# Patient Record
Sex: FEMALE | Race: BLACK OR AFRICAN AMERICAN | Employment: OTHER | URBAN - METROPOLITAN AREA
[De-identification: names, ages, dates, MRNs, and addresses within clinical notes are randomized per-mention and may not be internally consistent; named-entity substitution may affect disease eponyms.]

---

## 2017-09-06 ENCOUNTER — APPOINTMENT (OUTPATIENT)
Dept: PHYSICAL THERAPY | Age: 27
End: 2017-09-06

## 2017-09-12 ENCOUNTER — HOSPITAL ENCOUNTER (OUTPATIENT)
Dept: PHYSICAL THERAPY | Age: 27
Discharge: HOME | End: 2017-09-12
Payer: MEDICAID

## 2017-09-12 PROCEDURE — 97162 PT EVAL MOD COMPLEX 30 MIN: CPT

## 2017-09-12 PROCEDURE — 97110 THERAPEUTIC EXERCISES: CPT

## 2017-09-12 NOTE — PROGRESS NOTES
PT DAILY TREATMENT NOTE    Patient Name: Judith Ryder  Date:2017  : 1990  [x]  Patient  Verified  Payor: Chucho Wang / Plan: Wicho Bottoms / Product Type: Managed Care Medicaid /    In time:1:00  Out time:1:50  Total Treatment Time (min): 50  Total Timed Codes (min): 40  1:1 Treatment Time ( [address] only):    Visit #: 1 of 8    Physical Therapy Evaluation - Lumbar Spine      See Plan of Care for Subjective Info/History    General Health:  Red Flags Indicated?  [] Yes    [x] No  List:  Past History/Treatments:     Diagnostic Tests: [] Lab work [] X-rays    [] CT [] MRI     [x] Other: None  Results:    Functional Status  Prior level of function: Independent w/ ADL's, mother of 3 y/o child  Present functional limitations:    OBJECTIVE  Posture: Mild increased lumbar lordosis, genu valgus, decreased medial arch heights    Gait:  [x] Normal     [] Abnormal:    Active Movements: [] N/A   [] Too acute   [] Other:  ROM % AROM % PROM Comments:pain, area   Forward flexion  90  Central LBP   Extension  WFL     SB right  WFL     SB left  WFL     Rotation right  80%  Lumbar stiffness   Rotation left  80%  Lumbar stiffness     Repeated Movement Testing:    Neuro Screen [] WNL  Myotome/Dermatome/Reflexes:  Comments:    Dural Mobility:  SLR Sitting: [] R    [] L    [] +    [] -  @ (degrees):           Supine: [] R    [] L    [] +    [x] -  @ (degrees):   Slump Test: [] R    [] L    [] +    [x] -  @ (degrees):   Prone Knee Bend: [] R    [] L    [] +    [] -     Palpation  [] Min  [x] Mod  [] Severe    Location: Spinous processes and lumbar paraspinals L2-5  [] Min  [] Mod  [] Severe    Location:  [] Min  [] Mod  [] Severe    Location:      Strength   L(0-5) R (0-5) N/T   Hip Flexion (L1,2) 5 5 []   Knee Extension (L3,4) 5 5 []   Ankle Dorsiflexion (L4) 5 5 []   Great Toe Extension (L5)   []   Ankle Plantarflexion (S1) 4+ 4+ []   Knee Flexion (S1,2)   []   Upper Abdominals   []   Lower Abdominals   [] Paraspinals   []   Back Rotators   []   Gluteus Lon   []   Other   []     Special Tests  Lumbar:  Lumb. Compression: [] Pos  [] Neg               Lumbar Distraction:   [] Pos  [] Neg    Quadrant:  [] Pos  [x] Neg   [] Flex  [] Ext    Sacroilliac:  Gaenslen's: [] R    [] L    [] +    [] -     Compression: [] +    [x] -     Gapping:  [] +    [x] -     Thigh Thrust: [] R    [] L    [] +    [] -     Leg Length: [] +    [x] -   Position:           Hip: Byron Pat:  [] R    [] L    [] +    [] -     Scour:  [] R    [] L    [] +    [] -     Piriformis: [] R    [] L    [] +    [x] -     Other tests/comments:  OBJECTIVE  Modality (rationale): Increase tissue extensibility to improve the ability to change and maintain body/trunk positions    []  E-Stim: type _ x _ min     []att   []unatt   []w/US   []w/ice   []w/heat  []  Traction: []cerv   []pelvic   _ lbs x _ min     []pro   []sup   []int   []const  []  Ultrasound: []cont   []pulse    _ W/cm2 x _  min   []1MHz   []3MHz  []  Iontophoresis: []take home patch w/ dexamethazone    []40mA   []80mA                               []_ mA min w/: []dexamethazone   []other:_  []  Ice pack _  min     [x] Hot pack 10_  min  - lumbar  [] Paraffin _  min  []  Other:     Therapeutic Exercise: [x] see flow sheet     [] Other:_  Added/Changed Exercises:  [x]  Added:_  to improve (function): improve the patient's ability to change and maintain body/trunk positions  []  Changed:_ to improve (function):  (minutes: 10 )    Other Objective/Functional Measures:   Pain Level (0-10 scale) post treatment: 5    ASSESSMENT  [x]  See Plan of Care  Patient is assigned a medium evaluation complexity based upon presence of 2 medical comorbidities, FOTO score, and changing symptom characteristics.     PLAN  See Sal aMncuso 9/12/2017  1:02 PM
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
## 2017-09-15 ENCOUNTER — HOSPITAL ENCOUNTER (OUTPATIENT)
Dept: PHYSICAL THERAPY | Age: 27
Discharge: HOME | End: 2017-09-15
Payer: MEDICAID

## 2017-09-15 PROCEDURE — 97110 THERAPEUTIC EXERCISES: CPT

## 2017-09-15 NOTE — PROGRESS NOTES
PT DAILY TREATMENT NOTE 8-14    Patient Name: Ellen Guthrie  Date:9/15/2017  : 1990  [x]  Patient  Verified  Payor: Randal Donovan / Plan: Fanny Rodriguez / Product Type: Managed Care Medicaid /    In time:9:28  Out time:10:12  Total Treatment Time (min): 44  Total Timed Codes (min): 29  1:1 Treatment Time (min):    Visit #: 2 of 8    Treatment Area: Back pain [M54.9]    SUBJECTIVE  Pain Level (0-10 scale): 0  Any medication changes, allergies to medications, adverse drug reactions, diagnosis change, or new procedure performed?: [x] No    [] Yes (see summary sheet for update)  Subjective functional status/changes:   [] No changes reported  My back actually doesn't feel too bad today,but it bothers me if I do too much or if I sit or lay down for too long.     OBJECTIVE  Modality rationale: decrease pain and increase tissue extensibility to improve the patients ability to improve functional abilities   Min Type Additional Details    [] Estim: []Att   []Unatt        []TENS instruct                  []IFC  []Premod   []NMES                     []Other:  []w/US   []w/ice   []w/heat  Position:  Location:    []  Traction: [] Cervical       []Lumbar                       [] Prone          []Supine                       []Intermittent   []Continuous Lbs:  [] before manual  [] after manual    []  Ultrasound: []Continuous   [] Pulsed                           []1MHz   []3MHz Location:  W/cm2:    []  Iontophoresis with dexamethasone         Location: [] Take home patch   [] In clinic   10 []  Ice     [x]  heat  []  Ice massage Position:supine  Location:lumbar    []  Vasopneumatic Device Pressure:       [] lo [] med [] hi   Temperature: [] lo [] med [] hi   [] Skin assessment post-treatment:  []intact []redness- no adverse reaction       []redness - adverse reaction:       34 min Therapeutic Exercise:  [x] See flow sheet :   Rationale: increase ROM and increase strength to improve the patients ability to improve functional abilities           min Patient Education: [x] Review HEP    [] Progressed/Changed HEP based on:   [] positioning   [] body mechanics   [] transfers   [] heat/ice application        Other Objective/Functional Measures:     Pain Level (0-10 scale) post treatment: 0    ASSESSMENT/Changes in Function: Pt tolerated all new therex well upon trial today with chief c/o bilateral lumbar quadrant pain/symptoms with prolonged static sitting and laying. pt needs the most focus on lumbar/LE flexibility and lumbopelvic/core stabilization. Pt was challenged the most with maintaining lumbopelvic/core stability with seated stability ball marches and bridges. Will continue to progress/advance patient within current POC as tolerated with monitoring symptoms. Patient will continue to benefit from skilled PT services to modify and progress therapeutic interventions, address functional mobility deficits, address ROM deficits, address strength deficits, analyze and cue movement patterns, analyze and modify body mechanics/ergonomics and assess and modify postural abnormalities to attain remaining goals.      []  See Plan of Care  []  See progress note/recertification  []  See Discharge Summary         Progress towards goals / Updated goals:      PLAN  [x]  Upgrade activities as tolerated     []  Continue plan of care  []  Update interventions per flow sheet       []  Discharge due to:_  []  Other:_      Ned Toth, PTA 9/15/2017  9:25 AM

## 2017-09-18 ENCOUNTER — HOSPITAL ENCOUNTER (OUTPATIENT)
Dept: PHYSICAL THERAPY | Age: 27
Discharge: HOME | End: 2017-09-18
Payer: MEDICAID

## 2017-09-18 PROCEDURE — 97110 THERAPEUTIC EXERCISES: CPT

## 2017-09-18 NOTE — PROGRESS NOTES
PT DAILY TREATMENT NOTE 8-14    Patient Name: Mayco Galeano  Date:2017  : 1990  [x]  Patient  Verified  Payor: Belinda Esposito / Plan: Frankey Dam / Product Type: Managed Care Medicaid /    In time:3:01  Out time:3:50  Total Treatment Time (min): 49  Total Timed Codes (min): 34  1:1 Treatment Time (min):    Visit #: 3 of 8    Treatment Area: Back pain [M54.9]    SUBJECTIVE  Pain Level (0-10 scale): 0  Any medication changes, allergies to medications, adverse drug reactions, diagnosis change, or new procedure performed?: [x] No    [] Yes (see summary sheet for update)  Subjective functional status/changes:   [] No changes reported  I had a little bit of pain for about an hour after I got home from the therapy last time, but I haven't really had any since, so I am fine.     OBJECTIVE  Modality rationale: decrease pain and increase tissue extensibility to improve the patients ability to improve functional abilities   Min Type Additional Details    [] Estim: []Att   []Unatt        []TENS instruct                  []IFC  []Premod   []NMES                     []Other:  []w/US   []w/ice   []w/heat  Position:  Location:    []  Traction: [] Cervical       []Lumbar                       [] Prone          []Supine                       []Intermittent   []Continuous Lbs:  [] before manual  [] after manual    []  Ultrasound: []Continuous   [] Pulsed                           []1MHz   []3MHz Location:  W/cm2:    []  Iontophoresis with dexamethasone         Location: [] Take home patch   [] In clinic   10 []  Ice     [x]  heat  []  Ice massage Position:supine  Location:lumbar    []  Vasopneumatic Device Pressure:       [] lo [] med [] hi   Temperature: [] lo [] med [] hi   [] Skin assessment post-treatment:  []intact []redness- no adverse reaction       []redness - adverse reaction:       39 min Therapeutic Exercise:  [x] See flow sheet :   Rationale: increase ROM and increase strength to improve the patients ability to improve functional abilities           min Patient Education: [x] Review HEP    [] Progressed/Changed HEP based on:   [] positioning   [] body mechanics   [] transfers   [] heat/ice application        Other Objective/Functional Measures:     Pain Level (0-10 scale) post treatment: 0    ASSESSMENT/Changes in Function: Pt presents with only c/o mild post exercise soreness for approximately 1 hour after last tx, but denies any increase in symptoms since. Pt was also able to advance to addition of level 1 dead bug stabs with min to mod challenge today. Will continue to progress/advance patient within current POC as tolerated with monitoring symptoms. Patient will continue to benefit from skilled PT services to modify and progress therapeutic interventions, address functional mobility deficits, address ROM deficits, address strength deficits, analyze and cue movement patterns, analyze and modify body mechanics/ergonomics and assess and modify postural abnormalities to attain remaining goals.      []  See Plan of Care  []  See progress note/recertification  []  See Discharge Summary         Progress towards goals / Updated goals:      PLAN  [x]  Upgrade activities as tolerated     []  Continue plan of care  []  Update interventions per flow sheet       []  Discharge due to:_  []  Other:_      Connie Lai, RADHA 9/18/2017  3:11 PM

## 2017-09-20 ENCOUNTER — HOSPITAL ENCOUNTER (OUTPATIENT)
Dept: PHYSICAL THERAPY | Age: 27
Discharge: HOME | End: 2017-09-20
Payer: MEDICAID

## 2017-09-20 PROCEDURE — 97110 THERAPEUTIC EXERCISES: CPT

## 2017-09-20 NOTE — PROGRESS NOTES
PT DAILY TREATMENT NOTE 8-14    Patient Name: Judith Ryder  Date:2017  : 1990  [x]  Patient  Verified  Payor: Chucho Wang / Plan: [id number] / Product Type: Managed Care Medicaid /    In time:3:00  Out time:3:49  Total Treatment Time (min): 49  Total Timed Codes (min): 34  1:1 Treatment Time (min):    Visit #: 4 of 8    Treatment Area: Back pain [M54.9]    SUBJECTIVE  Pain Level (0-10 scale): 0  Any medication changes, allergies to medications, adverse drug reactions, diagnosis change, or new procedure performed?: [x] No    [] Yes (see summary sheet for update)  Subjective functional status/changes:   [] No changes reported  My back has been feeling pretty good lately, I actually haven't had any pain or even any soreness at all in my back since I was here last.    OBJECTIVE  Modality rationale: decrease pain and increase tissue extensibility to improve the patients ability to improve functional abilities   Min Type Additional Details    [] Estim: []Att   []Unatt        []TENS instruct                  []IFC  []Premod   []NMES                     []Other:  []w/US   []w/ice   []w/heat  Position:  Location:    []  Traction: [] Cervical       []Lumbar                       [] Prone          []Supine                       []Intermittent   []Continuous Lbs:  [] before manual  [] after manual    []  Ultrasound: []Continuous   [] Pulsed                           []1MHz   []3MHz Location:  W/cm2:    []  Iontophoresis with dexamethasone         Location: [] Take home patch   [] In clinic   10 []  Ice     [x]  heat  []  Ice massage Position:supine  Location:lumbar    []  Vasopneumatic Device Pressure:       [] lo [] med [] hi   Temperature: [] lo [] med [] hi   [] Skin assessment post-treatment:  []intact []redness- no adverse reaction       []redness - adverse reaction:       39 min Therapeutic Exercise:  [x] See flow sheet :   Rationale: increase ROM and increase strength to improve the patients ability to improve functional abilities         min Patient Education: [x] Review HEP    [] Progressed/Changed HEP based on:   [] positioning   [] body mechanics   [] transfers   [] heat/ice application        Other Objective/Functional Measures:     Pain Level (0-10 scale) post treatment: 1-2/10    ASSESSMENT/Changes in Function: Patient reports approximately 50% overall improvement from therapy since initial evaluation. Pt was also able to advance to addition of pelvic tilts with overhead medicine ball raises with min to mod challenge with lordotic control today. Will continue to progress/advance patient within current POC as tolerated with monitoring symptoms. Patient will continue to benefit from skilled PT services to modify and progress therapeutic interventions, address functional mobility deficits, address ROM deficits, address strength deficits, analyze and address soft tissue restrictions, analyze and cue movement patterns, analyze and modify body mechanics/ergonomics and assess and modify postural abnormalities to attain remaining goals.      []  See Plan of Care  []  See progress note/recertification  []  See Discharge Summary         Progress towards goals / Updated goals:  STG #2=Patient will independently demonstrate proper lifting mechanics to promote lumbar safety and reduced injury risk:Met, as of today with ability to perform 5 crate lifts floor<>bed with good technique    PLAN  [x]  Upgrade activities as tolerated     []  Continue plan of care  []  Update interventions per flow sheet       []  Discharge due to:_  []  Other:_      Chidi Daigle, RADHA 9/20/2017  3:03 PM

## 2017-09-25 ENCOUNTER — HOSPITAL ENCOUNTER (OUTPATIENT)
Dept: PHYSICAL THERAPY | Age: 27
Discharge: HOME | End: 2017-09-25
Payer: MEDICAID

## 2017-09-25 PROCEDURE — 97110 THERAPEUTIC EXERCISES: CPT

## 2017-09-25 NOTE — PROGRESS NOTES
PT DAILY TREATMENT NOTE 8-14    Patient Name: Kesha Guthrie  Date:2017  : 1990  [x]  Patient  Verified  Payor: Jose Blackman / Plan: [employer] 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     OBJECTIVE  Modality rationale: decrease pain and increase tissue extensibility to improve the patients ability to improve functional abilities   Min Type Additional Details    [] Estim: []Att   []Unatt        []TENS instruct                  []IFC  []Premod   []NMES                     []Other:  []w/US   []w/ice   []w/heat  Position:  Location:    []  Traction: [] Cervical       []Lumbar                       [] Prone          []Supine                       []Intermittent   []Continuous Lbs:  [] before manual  [] after manual    []  Ultrasound: []Continuous   [] Pulsed                           []1MHz   []3MHz Location:  W/cm2:    []  Iontophoresis with dexamethasone         Location: [] Take home patch   [] In clinic   10 []  Ice     [x]  heat  []  Ice massage Position:  Location:lumbar    []  Vasopneumatic Device Pressure:       [] lo [] med [] hi   Temperature: [] lo [] med [] hi   [] Skin assessment post-treatment:  []intact []redness- no adverse reaction       []redness - adverse reaction:       37 min Therapeutic Exercise:  [x] See flow sheet :   Rationale: increase ROM and increase strength to improve the patients ability to improve functional abilities           min Patient Education: [x] Review HEP    [] Progressed/Changed HEP based on:   [] positioning   [] body mechanics   [] transfers   [] heat/ice application        Other Objective/Functional Measures:     Pain Level (0-10 scale) post treatment: 3/10    ASSESSMENT/Changes in Function: Pt presented with only increase in R lumbar quadrant pain/sxs with prolonged standing activity for appx 1 hour since last treatment, but denies and LE radicular pain. Will continue to progress/advance patient within current POC as tolerated with monitoring symptoms. Patient will continue to benefit from skilled PT services to modify and progress therapeutic interventions, address functional mobility deficits, address ROM deficits, address strength deficits, analyze and cue movement patterns, analyze and modify body mechanics/ergonomics and assess and modify postural abnormalities to attain remaining goals.      []  See Plan of Care  []  See progress note/recertification  []  See Discharge Summary         Progress towards goals / Updated goals:      PLAN  [x]  Upgrade activities as tolerated     []  Continue plan of care  []  Update interventions per flow sheet       []  Discharge due to:_  []  Other:_      Latanya Ochoa, RADHA 9/25/2017  3:06 PM

## 2017-09-27 ENCOUNTER — HOSPITAL ENCOUNTER (OUTPATIENT)
Dept: PHYSICAL THERAPY | Age: 27
Discharge: HOME | End: 2017-09-27
Payer: MEDICAID

## 2017-09-27 PROCEDURE — 97110 THERAPEUTIC EXERCISES: CPT

## 2017-09-27 NOTE — PROGRESS NOTES
PT DAILY TREATMENT NOTE 8-    Patient Name: Loraine Parada  Date:2017  : 1990  [x]  Patient  Verified  Payor: Jazmin Sorto / Plan: [id number] & Clear / Product Type: Managed Care Medicaid /    In time:3:06  Out time:4:01  Total Treatment Time (min): 55  Total Timed Codes (min): 39  1:1 Treatment Time (min):    Visit #: 6 of 8    Treatment Area: Back pain [M54.9]    SUBJECTIVE  Pain Level (0-10 scale): 0  Any medication changes, allergies to medications, adverse drug reactions, diagnosis change, or new procedure performed?: [x] No    [] Yes (see summary sheet for update)  Subjective functional status/changes:   [] No changes reported  My back has been feeling pretty good since I was here last, I haven't really had any flare ups that I can think of.     OBJECTIVE  Modality rationale: decrease pain and increase tissue extensibility to improve the patients ability to improve functional abilities   Min Type Additional Details    [] Estim: []Att   []Unatt        []TENS instruct                  []IFC  []Premod   []NMES                     []Other:  []w/US   []w/ice   []w/heat  Position:  Location:    []  Traction: [] Cervical       []Lumbar                       [] Prone          []Supine                       []Intermittent   []Continuous Lbs:  [] before manual  [] after manual    []  Ultrasound: []Continuous   [] Pulsed                           []1MHz   []3MHz Location:  W/cm2:    []  Iontophoresis with dexamethasone         Location: [] Take home patch   [] In clinic   10 []  Ice     [x]  heat  []  Ice massage Position:supine  Location:lumbar    []  Vasopneumatic Device Pressure:       [] lo [] med [] hi   Temperature: [] lo [] med [] hi   [] Skin assessment post-treatment:  []intact []redness- no adverse reaction       []redness - adverse reaction:       45 min Therapeutic Exercise:  [] See flow sheet :   Rationale: increase ROM and increase strength to improve the patients ability to improve functional abilities           min Patient Education: [x] Review HEP    [] Progressed/Changed HEP based on:   [] positioning   [] body mechanics   [] transfers   [] heat/ice application        Other Objective/Functional Measures:     Pain Level (0-10 scale) post treatment: 0    ASSESSMENT/Changes in Function: Pt presents with the only reoccurrence of pain/sxs with centralized lumbosacral pain/soreness for approximetely 1 hour after treatment sessions before abolishing. Pt was also able to advance to addition of level 2 dead bug stabs and seated stability ball UE/LE combo marches with min to mod challenge today. Will continue to progress/advance patient within current POC as tolerated with monitoring symptoms. Patient will continue to benefit from skilled PT services to modify and progress therapeutic interventions, address functional mobility deficits, address ROM deficits, address strength deficits, analyze and cue movement patterns, analyze and modify body mechanics/ergonomics and assess and modify postural abnormalities to attain remaining goals.      []  See Plan of Care  []  See progress note/recertification  []  See Discharge Summary         Progress towards goals / Updated goals:      PLAN  [x]  Upgrade activities as tolerated     []  Continue plan of care  []  Update interventions per flow sheet       []  Discharge due to:_  []  Other:_      Freda Villavicencio PTA 9/27/2017  3:11 PM

## 2017-10-02 ENCOUNTER — HOSPITAL ENCOUNTER (OUTPATIENT)
Dept: PHYSICAL THERAPY | Age: 27
Discharge: HOME | End: 2017-10-02
Payer: MEDICAID

## 2017-10-02 PROCEDURE — 97110 THERAPEUTIC EXERCISES: CPT

## 2017-10-02 NOTE — PROGRESS NOTES
PT DAILY TREATMENT NOTE 8-14    Patient Name: Kevin Miles  Date:10/2/2017  : 1990  [x]  Patient  Verified  Payor: Chel Evans / Plan: Frantz Angle / Product Type: Managed Care Medicaid /    In time:2:05  Out time:2:42  Total Treatment Time (min): 37  Total Timed Codes (min): 31  1:1 Treatment Time (min):    Visit #: 7 of 8    Treatment Area: Back pain [M54.9]    SUBJECTIVE  Pain Level (0-10 scale): 0  Any medication changes, allergies to medications, adverse drug reactions, diagnosis change, or new procedure performed?: [x] No    [] Yes (see summary sheet for update)  Subjective functional status/changes:   [] No changes reported  My back has been feeling pretty good since I was here last, no new issues at all, so I think that I will be fine with the next time being my last time. OBJECTIVE      37 min Therapeutic Exercise:  [x] See flow sheet :   Rationale: increase ROM and increase strength to improve the patients ability to improve functional abilities          min Patient Education: [x] Review HEP    [] Progressed/Changed HEP based on:   [] positioning   [] body mechanics   [] transfers   [] heat/ice application        Other Objective/Functional Measures:     Pain Level (0-10 scale) post treatment: 0    ASSESSMENT/Changes in Function: Pt presents with no reoccurrence of any lumbar pain/sxs since last tx and agrees that she is near maximum medical benefit/potential from current POC and will be ready for discharge next treatment as planned/discussed. Will continue to progress/advance patient within current POC as tolerated with monitoring symptoms.     Patient will continue to benefit from skilled PT services to modify and progress therapeutic interventions, address functional mobility deficits, address ROM deficits, address strength deficits, analyze and cue movement patterns, analyze and modify body mechanics/ergonomics and assess and modify postural abnormalities to attain remaining goals. []  See Plan of Care  []  See progress note/recertification  []  See Discharge Summary         Progress towards goals / Updated goals:  LTG #3=Patient will report >50% functional improvement with daily bending and lifting activities:Met, 100% improvement reported  Will review detailed progress/LTGs for planned discharge next visit.   PLAN  [x]  Upgrade activities as tolerated     []  Continue plan of care  []  Update interventions per flow sheet       []  Discharge due to:_  []  Other:_      Eric Ko PTA 10/2/2017  2:05 PM

## 2017-10-06 ENCOUNTER — HOSPITAL ENCOUNTER (OUTPATIENT)
Dept: PHYSICAL THERAPY | Age: 27
Discharge: HOME | End: 2017-10-06
Payer: MEDICAID

## 2017-10-06 PROCEDURE — 97110 THERAPEUTIC EXERCISES: CPT

## 2017-10-06 NOTE — PROGRESS NOTES
[facility] AT [address], [address]  Phone: [phone]   [phone]  DISCHARGE SUMMARY  Patient Name: Nataliia Adams : 1990   Treatment/Medical Diagnosis: Back pain [M54.9]   Referral Source: Celeste Hooks MD     Date of Initial Visit: 17 Attended Visits: 8 Missed Visits: 0     SUMMARY OF TREATMENT  Therapeutic exercise for lumbar/LE flexibility, postural awareness/strengthening, lumbopelvic/core stabilization, moist heat and HEP. CURRENT STATUS  Patient reports approximately  85% overall improvement from therapy since initial evaluation with no reported reoccurrence of increased pain/symptoms over the past 2 weeks. Pt presents with full expected lumbar AROM without any reproducible pain even at end range with only limitation with end range extension due to dysfunction. Pt has made excellent progress with gaining lumbar flexibility and advancing within level 2 lumbopelvic/core stabilization program within current POC. Pt also reports that she has returned to all premorbid ADL's without increasing pain/sxs over the past few visits. Pt was given and instructed in an updated HEP for continued self maintenance of reduced sxs after D/C from therapy. Lumbar AROM:  Flexion=90%; Extension= 65%; Side bending= 85% bilaterally   Goal/Measure of Progress Goal Met? 1. Patient will independently demonstrate proper lifting mechanics to promote lumbar safety and reduced injury risk   Status at last Eval: Progressing Current Status: Met yes   2. Improve FOTO outcome score by 15-20% to indicate a significant improvement in ADL function   Status at last Eval: 57/100 Current Status: 94/100 yes   3. Pt will demonstrate full lumbar AROM in all directions with <4/10 LBP to improve ADL's   Status at last Eval: Progressing Current Status: Met yes   4.   Patient will report >50% functional improvement with daily bending and lifting activities   Status at last Eval: Met, 100% improvement reported Current Status: Met, 100% improvement reported yes     RECOMMENDATIONS  Patient has achieved maximum medical benefit/potential from current POC after completing 8 of 8 prescribed visits and is ready for discharge from therapy at this time. If you have any questions/comments please contact us directly at [phone]. Thank you for allowing us to assist in the care of your patient.     LPTA Signature: Ansley Cui PTA Date: 10/6/17   Therapist Signature: MISTY Peters, [facility]   Time: 10:46 AM

## 2017-10-06 NOTE — PROGRESS NOTES
PT DAILY TREATMENT NOTE 8-14    Patient Name: Nima Montes De Oca  Date:10/6/2017  : 1990  [x]  Patient  Verified  Payor: Sherren Bari / Plan: Rey Flores / Product Type: Managed Care Medicaid /    In time:2:04  Out time:2:53  Total Treatment Time (min): 49  Total Timed Codes (min): 43  1:1 Treatment Time (min):    Visit #: 8 of 8    Treatment Area: Back pain [M54.9]    SUBJECTIVE  Pain Level (0-10 scale): 0  Any medication changes, allergies to medications, adverse drug reactions, diagnosis change, or new procedure performed?: [x] No    [] Yes (see summary sheet for update)  Subjective functional status/changes:   [] No changes reported  My back has been feeling really good since I was here last, no new pain or issues at all, so I am fine with today being my last day like we talked about before. OBJECTIVE      49 min Therapeutic Exercise:  [x] See flow sheet :   Rationale: increase ROM and increase strength to improve the patients ability to improve functional abilities           min Patient Education: [x] Review HEP    [] Progressed/Changed HEP based on:   [] positioning   [] body mechanics   [] transfers   [] heat/ice application        Other Objective/Functional Measures:     Pain Level (0-10 scale) post treatment: 0    ASSESSMENT/Changes in Function:   []  See Plan of Care  []  See progress note/recertification  [x]  See Discharge Summary         Progress towards goals / Updated goals:  See discharge summary for full final detailed progress towards all established goals.      PLAN  []  Upgrade activities as tolerated     []  Continue plan of care  []  Update interventions per flow sheet       [x]  Discharge due to:Patient has achieved maximum medical benefit/potential from current POC after completing 8 of 8 prescribed visits and is ready for discharge from therapy at this time._  []  Other:_      Shaan Garvey PTA 10/6/2017  1:57 PM

## 2019-02-27 PROBLEM — K56.609 BOWEL OBSTRUCTION (HCC): Status: ACTIVE | Noted: 2019-02-27

## 2020-07-06 PROBLEM — J18.9 CAP (COMMUNITY ACQUIRED PNEUMONIA): Status: ACTIVE | Noted: 2020-07-06

## 2020-07-06 PROBLEM — Z20.822 SUSPECTED COVID-19 VIRUS INFECTION: Status: ACTIVE | Noted: 2020-07-06

## 2020-07-06 PROBLEM — J18.9 RLL PNEUMONIA: Status: ACTIVE | Noted: 2020-07-06

## 2021-03-25 ENCOUNTER — HOSPITAL ENCOUNTER (OUTPATIENT)
Dept: PHYSICAL THERAPY | Age: 31
Discharge: HOME | End: 2021-03-25
Payer: MEDICAID

## 2021-03-25 PROCEDURE — 97162 PT EVAL MOD COMPLEX 30 MIN: CPT

## 2021-03-25 NOTE — PROGRESS NOTES
PT DAILY TREATMENT NOTE     Patient Name: Bethany Soto  Date:3/25/2021  : 1990  [x]  Patient  Verified  Payor: [employer] / Plan: Richardson Villanueva / Product Type: Managed Care Medicaid /    In time:500  Out time:545p  Total Treatment Time (min): 45  Visit #: 1 of     Medicare/BCBS Only   Total Timed Codes (min):  7 1:1 Treatment Time:  na       Treatment Area: Neck pain [M54.2]  Cervical radiculopathy [M54.12]  SUBJECTIVE  Pain Level (0-10 scale): 6  Any medication changes, allergies to medications, adverse drug reactions, diagnosis change, or new procedure performed?: [x] No    [] Yes (see summary sheet for update)  Subjective functional status/changes:   [] No changes reported  Pt initial eval see POC for details    OBJECTIVE    Modality rationale: decrease pain to improve the patient's ability to tolerate ADLs   Min Type Additional Details    [] Estim:  []Unatt       []IFC  []Premod                        []Other:  []w/ice   []w/heat  Position:  Location:    [] Estim: []Att    []TENS instruct  []NMES                    []Other:  []w/US   []w/ice   []w/heat  Position:  Location:    []  Traction: [] Cervical       []Lumbar                       [] Prone          []Supine                       []Intermittent   []Continuous Lbs:  [] before manual  [] after manual    []  Ultrasound: []Continuous   [] Pulsed                           []1MHz   []3MHz W/cm2:  Location:    []  Iontophoresis with dexamethasone         Location: [] Take home patch   [] In clinic   5 []  Ice     []  heat  []  Ice massage  []  Laser   []  Anodyne Position:supine  Location:c spine    []  Laser with stim  []  Other:  Position:  Location:    []  Vasopneumatic Device Pressure:       [] lo [] med [] hi   Temperature: [] lo [] med [] hi   [] Skin assessment post-treatment:  []intact []redness- no adverse reaction    []redness  adverse reaction:     33 min [x]Eval                  []Re-Eval       7 NC min Therapeutic Exercise:  [x] See flow sheet :   Rationale: increase ROM and increase strength to improve the patient's ability to lift, carry, and read without pain            With   [] TE   [] TA   [] neuro   [] other: Patient Education: [x] Review HEP    [] Progressed/Changed HEP based on:   [] positioning   [] body mechanics   [] transfers   [] heat/ice application    [] other:      Other Objective/Functional Measures:    Justification for Eval Code Complexity:  Patient History : see POC   Examination see exam   Clinical Presentation: evolving  Clinical Decision Making : FOTO : 51/100    Pain Level (0-10 scale) post treatment: 6    ASSESSMENT/Changes in Function: Pt was instructed in initial HEP required demo, vc, and tc for all exercises to perform correctly. Pt was given hand out detailing exercises and instructed in modification of exercises to tolerance, and in performing exercises safely. Pt verbalized understanding. Patient will continue to benefit from skilled PT services to modify and progress therapeutic interventions, address functional mobility deficits, address ROM deficits, address strength deficits, analyze and address soft tissue restrictions, analyze and cue movement patterns, analyze and modify body mechanics/ergonomics, assess and modify postural abnormalities, address imbalance/dizziness and instruct in home and community integration to attain remaining goals.      [x]  See Plan of Care  []  See progress note/recertification  []  See Discharge Summary         Progress towards goals / Updated goals:  No progress as goals were set today    PLAN  []  Upgrade activities as tolerated     []  Continue plan of care  []  Update interventions per flow sheet       []  Discharge due to:_  []  Other:_         Elmo Rodriguez 3/25/2021  1:40 PM    Future Appointments   Date Time Provider Chris Banegas   3/25/2021  5:00 PM Joaquin [facility]

## 2021-03-25 NOTE — PROGRESS NOTES
[address] [facility] AT [address], [address] [place] [address] Road  Phone: [phone]   [phone]  PLAN OF CARE / [address] [place] [address] PHYSICAL THERAPY SERVICES  Patient Name: Faisal Willard : 1990   Medical   Diagnosis: Neck pain [M54.2]  Cervical radiculopathy [M54.12] Treatment Diagnosis: Neck pain [M54.2]  Cervical radiculopathy [M54.12]   Onset Date: 2021     Referral Source: Lorice Kocher, NP Start of Care [facility] 3/25/2021   Prior Hospitalization: See medical history Provider #: [id number]   Prior Level of Function:  no neck pain ,I in all ADLs and functional mobility, able to lift, reach, and do hair without pain   Comorbidities: Thyroid problems, diabetes, HTN, asthma, RA   Medications: Verified on Patient Summary List   The Plan of Care and following information is based on the information from the initial evaluation.   ===========================================================================================  Assessment / key information:  Pt is a 26 y.o. F  who presents with neck pain and cervical radiculopathy. Current deficits include: dec cervical and shoulder ROM, dec shoulder and periscapular strength, dec postural dynamic stability. Functional deficits include: impaired reaching, lifting, reading, and limited ability to style hair. Crystal Ana FOTO score indicates 49% functional impairment. Home exercise program initiated on initial evaluation to address these deficits. Pt would benefit from PT to address these deficits for increased functional mobility and QOL. SHERWIN: Pt reports she saw her MD in December or January this year due to head aches. Her MD did an MRI which she reports showed a \"slipped disc\" . She isn't sure how the neck  And shoulder pain started, but thinks it could be due to lifting her wheelchair bound daughter.   Her daughter weighs about 40lbs, and Pt has to lift her to and from her wheel chair multiple times per day to care for her. The pain in her neck and shoulder is like an ache from her neck down to her shoulder blade R>L. The pain is worse with looking down to read/do computer work, lifting her daughter is painful, and doing her hair is painful too. The pain is better with changing position, rest and laying down. PAIN:  Location- neck and shoulders R>L  Current-6  Best-0  Worst- 7  Alleviating factors: rest, position changes  Aggravating factors: lifting, computer work, styling hair. OBJECTIVE:    MMT:  Shoulder:  - flexion L=3+/5 R=3+/5!  - extension L=3/5 R=3/5  - abduction L=4/5! R=4-/5!  - IR L=5/5 R=4/5  - ER L=3/5 R=3/5  Hand  -   R 29.8  lbs, L 46.6  lbs    Sensation: light touch grossly intact today    AROM:  Cervical  Flexion 50 deg  Extension 30 deg  sidebend R 35 deg  sidebend L 30 deg  rotate R 60 deg  rotate L 50deg  Shoulder: AROM grossly WFL and equal with exception of DUDLEY L=T5 R=T3! Outcome measure: FOTO= 51    Posture: significant forward head, rounded shoulders, hinge point at C6, flattened t spine    Palpation for tenderness: TTP of KAUSHIK rhomboids, middle and lower traps. Upper traps, scalenes, and scom R>L    Special Tests:  - Spurlings positive KAUSHIK   - ULTT positive KAUSHIK for median nerve tension    ===========================================================================================  Eval Complexity: History HIGH Complexity :3+ comorbidities / personal factors will impact the outcome/ POC ;  Examination  MEDIUM Complexity : 3 Standardized tests and measures addressing body structure, function, activity limitation and / or participation in recreation ; Presentation MEDIUM Complexity : Evolving with changing characteristics ;   Decision Making MEDIUM Complexity : FOTO score of 26-74; Overall Complexity MEDIUM  Problem List: pain affecting function, decrease ROM, decrease strength, edema affecting function, impaired gait/ balance, decrease ADL/ functional abilitiies, decrease activity tolerance, decrease flexibility/ joint mobility and decrease transfer abilities   Treatment Plan may include any combination of the following: Therapeutic exercise, Therapeutic activities, Neuromuscular re-education, Physical agent/modality, Gait/balance training, Manual therapy, Patient education, Self Care training, Functional mobility training, Home safety training and Stair training  Patient / Family readiness to learn indicated by: asking questions  Persons(s) to be included in education: patient (P)  Barriers to Learning/Limitations: None  Measures taken, if barriers to learning:    Patient Goal (s): Feel little better   Patient self reported health status: fair  Rehabilitation Potential: good  Short Term Goals: To be accomplished in 1 weeks:  1) Goal: Patient will be independent and compliant with HEP in order to progress toward long term goals. Status at last note/certification: issued and reviewed  Long Term Goals: To be accomplished in 8-12 treatments:  1) Goal: Patient will improve FOTO assessment score to 64 pts in order to indicate improved functional abilities. Status at last note/certification: 51 pts  2) Goal: Patient will improve cervical rotation to 65 deg KAUSHIK for ADLs  Status at last note/certification: RRot 39PVM, LRot 50 deg  3) Goal: Patient will report worst neck/shoulder pain as 1/10 or less in order to progress toward personal goals. Status at last note/certification: 6/11  4) Goal: Patient will report overall at least 75% improvement in function in order to progress toward premorbid status.   Status at last note/certification: n/a  5) Goal: Patient will demonstrated ability to lift and carry 40+ lbs with proper form and no pain to care for her daughter  Status at last note/certification: pain with all lifting    Frequency / Duration:   Patient to be seen  1-2  times per week for 4-6  weeks:  Patient / Caregiver education and instruction: exercises  Therapist Signature: Myles Wood Date: 6/32/9172   Certification Period: na Time: 1:39 PM   ===========================================================================================  I certify that the above Physical Therapy Services are being furnished while the patient is under my care. I agree with the treatment plan and certify that this therapy is necessary. Physician Signature:        Date:       Time:        Neeraj Rob NP  Please sign and return to [facility] [place] [facility] or you may fax the signed copy to [phone]. Thank you.

## 2021-03-30 ENCOUNTER — APPOINTMENT (OUTPATIENT)
Dept: PHYSICAL THERAPY | Age: 31
End: 2021-03-30
Payer: MEDICAID

## 2021-03-31 ENCOUNTER — HOSPITAL ENCOUNTER (OUTPATIENT)
Dept: PHYSICAL THERAPY | Age: 31
Discharge: HOME | End: 2021-03-31
Payer: MEDICAID

## 2021-03-31 PROCEDURE — 97110 THERAPEUTIC EXERCISES: CPT

## 2021-03-31 PROCEDURE — 97112 NEUROMUSCULAR REEDUCATION: CPT

## 2021-03-31 PROCEDURE — 97530 THERAPEUTIC ACTIVITIES: CPT

## 2021-03-31 NOTE — PROGRESS NOTES
PHYSICAL THERAPY - DAILY TREATMENT NOTE    Patient Name: Yamilex Velásquez        Date: 3/31/2021  : 1990   yes Patient  Verified  Visit #:   2   of     Insurance: Payor: David Mittal / Plan: Christine  / Product Type: Managed Care Medicaid /      In time: 10:36 Out time: 11:03   Total Treatment Time: 27     Medicare/[facility] Time Tracking (below)   Total Timed Codes (min):  na 1:1 Treatment Time:  na     TREATMENT AREA =  Neck pain [M54.2]  Cervical radiculopathy [M54.12]    SUBJECTIVE  Pain Level (on 0 to 10 scale):  0  / 10   Medication Changes/New allergies or changes in medical history, any new surgeries or procedures?    no  If yes, update Summary List   Subjective Functional Status/Changes:  []  No changes reported     \"My neck hasn't been bothering me much. My right arm doesn't have the tingling anymore but I've noticed my left side starting a little\". Pt reports compliance to HEP but notes she stops when it begins to hurt.            OBJECTIVE  11 min Therapeutic Exercise:     Rationale: increase ROM, increase strength, improve coordination, improve balance, and increase proprioception to improve the patients ability to progress to functional activities limited by pain or other dysfunction     8 min Therapeutic Activity:     Rationale: to improve functional activities, model real life movements and performance specific to the patients need with supervision to return the patient to their PLOF      8 min Neuromuscular Re-education:     Rationale: exercises designed to improve and/or maintain balance, coordination, kinesthetic sense, posture, and/or proprioception for functional activities to improve the patients ability to function in daily life       Billed With/As:   [x] TE   [x] TA   [x] Neuro   [] Self Care Patient Education: [x] Review HEP    [] Progressed/Changed HEP based on:   [] positioning   [] body mechanics   [] transfers   [] heat/ice application    [] other:      Other Objective/Functional Measures:    Inc N/T to L lateral forearm and 4th/5th digit after doorway stretch  Cueing for posture throughout treatment  (+) ulnar n. Tension test L     Post Treatment Pain Level (on 0 to 10) scale:   0  / 10     ASSESSMENT  Assessment/Changes in Function:     Pt c/o paresthesias in L ulnar nerve distribution after doorway stretch, and B UE N/T following cervical retractions. Added ulnar n mobs to HEP. []  See Progress Note/Recertification   Patient will continue to benefit from skilled PT services to modify and progress therapeutic interventions, address functional mobility deficits, address ROM deficits, address strength deficits, analyze and address soft tissue restrictions, analyze and cue movement patterns, analyze and modify body mechanics/ergonomics, assess and modify postural abnormalities and instruct in home and community integration to attain remaining goals. Progress toward goals / Updated goals:    Short Term Goals: To be accomplished in 1 weeks:  1) Patient will be independent and compliant with HEP in order to progress toward long term goals. 3/31/21: goal in progress, reports compliance  Long Term Goals: To be accomplished in 8-12 treatments:  1) Patient will improve FOTO assessment score to 64 pts in order to indicate improved functional abilities. 2) Patient will improve cervical rotation to 65 deg KAUSHIK for ADLs  3) Patient will report worst neck/shoulder pain as 1/10 or less in order to progress toward personal goals. 4) Patient will report overall at least 75% improvement in function in order to progress toward premorbid status.   5) Patient will demonstrated ability to lift and carry 40+ lbs with proper form and no pain to care for her daughter       PLAN  []  Upgrade activities as tolerated yes Continue plan of care   []  Discharge due to :    []  Other:      Therapist: Keith Cuevas PT    Date: 3/31/2021 Time: 10:57 AM     Future Appointments   Date Time Provider Chris Banegas   4/5/2021  6:00 PM Sal Daniels [facility]   4/8/2021  3:30 PM Tatiana Weinstein [facility]   4/12/2021  6:00 PM Val Lorenzo PT [facility]   4/14/2021  4:30 PM Sailaja Genao PTA [facility]   4/19/2021  6:00 PM Deepali AVILA, PT [facility]   4/21/2021  3:45 PM Sailaja Genao PTA [facility]   4/26/2021  6:00 PM Deepali AVILA, PT [facility]   4/28/2021  3:45 PM Sailaja Genao PTA [facility]

## 2021-04-05 ENCOUNTER — HOSPITAL ENCOUNTER (OUTPATIENT)
Dept: PHYSICAL THERAPY | Age: 31
Discharge: HOME | End: 2021-04-05
Payer: MEDICAID

## 2021-04-05 PROCEDURE — 97112 NEUROMUSCULAR REEDUCATION: CPT

## 2021-04-05 PROCEDURE — 97530 THERAPEUTIC ACTIVITIES: CPT

## 2021-04-05 PROCEDURE — 97110 THERAPEUTIC EXERCISES: CPT

## 2021-04-05 NOTE — PROGRESS NOTES
PHYSICAL THERAPY - DAILY TREATMENT NOTE    Patient Name: Marielena Pleitez        Date: 2021  : 1990   yes Patient  Verified  Visit #:   3     Insurance: Payor: Sandra Pryor / Plan: Lisa Carranza / Product Type: Managed Care Medicaid /      In time: 6:00 Out time: 6:37   Total Treatment Time: 37     Medicare/[employer] Time Tracking (below)   Total Timed Codes (min):  na 1:1 Treatment Time:  na     TREATMENT AREA =  Neck pain [M54.2]  Cervical radiculopathy [M54.12]    SUBJECTIVE  Pain Level (on 0 to 10 scale):  0  / 10   Medication Changes/New allergies or changes in medical history, any new surgeries or procedures?    no  If yes, update Summary List   Subjective Functional Status/Changes:  []  No changes reported     \"I haven't really had any pain since I was here last. The N/T is getting better with that stretch you gave me too\"       OBJECTIVE    17 min Therapeutic Exercise:     Rationale: increase ROM, increase strength, improve coordination, improve balance, and increase proprioception to improve the patients ability to progress to functional activities limited by pain or other dysfunction     9 min Therapeutic Activity:     Rationale: to improve functional activities, model real life movements and performance specific to the patients need with supervision to return the patient to their PLOF      11 min Neuromuscular Re-education:     Rationale: exercises designed to improve and/or maintain balance, coordination, kinesthetic sense, posture, and/or proprioception for functional activities to improve the patients ability to function in daily life          Billed With/As:   [x] TE   [] TA   [] Neuro   [] Self Care Patient Education: [x] Review HEP    [] Progressed/Changed HEP based on:   [] positioning   [] body mechanics   [] transfers   [] heat/ice application    [] other:      Other Objective/Functional Measures:     Added retro UBE for postural control/endurance  R shoulder/UT pain after 3 reps floor to waist box lifts @ 17#  C/s rot AROM R 71, L 60     Post Treatment Pain Level (on 0 to 10) scale:   0  / 10     ASSESSMENT  Assessment/Changes in Function:     Initiated light box lifts/carries to improve body mechanics when transferring her child. Pt c/o inc R UT pain after 3 reps of lifts floor to waist, likely related to heavy UT hike/compensation. Added UBE retro to improve postural muscle endurance. Cont to c/o inc N/T L>R hand following 4 reps of c/s retraction; plan to perform in supine at NV and assess response.      []  See Progress Note/Recertification   Patient will continue to benefit from skilled PT services to modify and progress therapeutic interventions, address functional mobility deficits, address ROM deficits, address strength deficits, analyze and address soft tissue restrictions, analyze and cue movement patterns, analyze and modify body mechanics/ergonomics, assess and modify postural abnormalities and instruct in home and community integration to attain remaining goals. Progress toward goals / Updated goals:    Short Term Goals: To be accomplished in 1 weeks:  1) Patient will be independent and compliant with HEP in order to progress toward long term goals. 3/31/21: goal in progress, reports compliance  Long Term Goals: To be accomplished in 8-12 treatments:  1) Patient will improve FOTO assessment score to 64 pts in order to indicate improved functional abilities. 2) Patient will improve cervical rotation to 65 deg KAUSHIK for ADLs 4/5/21: RR 70, LR 60, goal in progress2  3) Patient will report worst neck/shoulder pain as 1/10 or less in order to progress toward personal goals. 4) Patient will report overall at least 75% improvement in function in order to progress toward premorbid status.   5) Patient will demonstrated ability to lift and carry 40+ lbs with proper form and no pain to care for her daughter 4/5/21: initiated box lifts floor -> waist and box carries this visit, goal in progress     PLAN  []  Upgrade activities as tolerated yes Continue plan of care   []  Discharge due to :    []  Other:      Therapist: Chinmay Donovan PT    Date: 4/5/2021 Time: 3:21 PM     Future Appointments   Date Time Provider Chris Banegas   4/5/2021  6:00 PM Rossie Bumpers, PT [facility]   4/8/2021  3:30 PM Andrea Sandoval [facility]   4/12/2021  6:00 PM Rossie Bumpers, PT [facility]   4/14/2021  4:30 PM Iván Atkinson, PTA [facility]   4/19/2021  6:00 PM Rossie Bumpers, PT [facility]   4/21/2021  3:45 PM Iván Atkinson, PTA [facility]   4/26/2021  6:00 PM Mindy AVILA, PT [facility]   4/28/2021  3:45 PM Iván Atkinson, PTA [facility]

## 2021-04-08 ENCOUNTER — APPOINTMENT (OUTPATIENT)
Dept: PHYSICAL THERAPY | Age: 31
End: 2021-04-08
Payer: MEDICAID

## 2021-04-12 ENCOUNTER — APPOINTMENT (OUTPATIENT)
Dept: PHYSICAL THERAPY | Age: 31
End: 2021-04-12
Payer: MEDICAID

## 2021-04-14 ENCOUNTER — APPOINTMENT (OUTPATIENT)
Dept: PHYSICAL THERAPY | Age: 31
End: 2021-04-14
Payer: MEDICAID

## 2021-04-19 ENCOUNTER — HOSPITAL ENCOUNTER (OUTPATIENT)
Dept: PHYSICAL THERAPY | Age: 31
Discharge: HOME | End: 2021-04-19
Payer: MEDICAID

## 2021-04-19 PROCEDURE — 97112 NEUROMUSCULAR REEDUCATION: CPT

## 2021-04-19 PROCEDURE — 97530 THERAPEUTIC ACTIVITIES: CPT

## 2021-04-19 PROCEDURE — 97110 THERAPEUTIC EXERCISES: CPT

## 2021-04-19 NOTE — PROGRESS NOTES
PHYSICAL THERAPY - DAILY TREATMENT NOTE    Patient Name: Kevin Monotya        Date: 2021  : 1990   yes Patient  Verified  Visit #:   4     Insurance: Payor: Cordell Cason / Plan: Mikie Band / Product Type: Managed Care Medicaid /      In time: 5:50 Out time: 6:25   Total Treatment Time: 35     Medicare/[employer] Time Tracking (below)   Total Timed Codes (min):  na 1:1 Treatment Time:  na     TREATMENT AREA =  Neck pain [M54.2]  Cervical radiculopathy [M54.12]    SUBJECTIVE  Pain Level (on 0 to 10 scale):  4  / 10   Medication Changes/New allergies or changes in medical history, any new surgeries or procedures?    no  If yes, update Summary List   Subjective Functional Status/Changes:  []  No changes reported     Pt reports she recently started back at school two days a week and has noticed an increase in her neck pain/tension as a result of prolonged computer work. Reports she has been lifting her daughter more frequently as well.  Some N/T in the L hand upon arrival to PT          OBJECTIVE     15 min Therapeutic Exercise:     Rationale: increase ROM, increase strength, improve coordination, improve balance, and increase proprioception to improve the patients ability to progress to functional activities limited by pain or other dysfunction      9 min Therapeutic Activity:     Rationale: to improve functional activities, model real life movements and performance specific to the patients need with supervision to return the patient to their PLOF      11 min Neuromuscular Re-education:     Rationale: exercises designed to improve and/or maintain balance, coordination, kinesthetic sense, posture, and/or proprioception for functional activities to improve the patients ability to function in daily life    Billed With/As:   [x] TE   [] TA   [] Neuro   [] Self Care Patient Education: [x] Review HEP    [] Progressed/Changed HEP based on:   [] positioning   [] body mechanics   [] transfers   [] heat/ice application    [] other:      Other Objective/Functional Measures:    Progressed scap retract to B TB ER  Heavy cues for scap depression t/o treatment  Inc tension R>L UT  Attempted chin tucks in supine 2' reports of inc N/T when performing in sitting; reported B N/T after 5 reps in supine, resolved quickly up completion of exercise  Added supine SA press, c/o inc mid back pain on press portion; attempted to address w/ rhomboid stretch however pt reporting inc L shoulder pain during stretch     Post Treatment Pain Level (on 0 to 10) scale:   3  / 10     ASSESSMENT  Assessment/Changes in Function:     Education re: work station ergonomics and use of lumbar roll for postural support with computer work. Pt continues to demo heavy UT compensation when performing resistance exercises; frequent verbal cueing for awareness. Reassess for MD RAND at . [address]     []  See Progress Note/Recertification   Patient will continue to benefit from skilled PT services to modify and progress therapeutic interventions, address functional mobility deficits, address ROM deficits, address strength deficits, analyze and address soft tissue restrictions, analyze and cue movement patterns, analyze and modify body mechanics/ergonomics, assess and modify postural abnormalities and instruct in home and community integration to attain remaining goals. Progress toward goals / Updated goals:    Short Term Goals: To be accomplished in 1 weeks:  1) Patient will be independent and compliant with HEP in order to progress toward long term goals. 3/31/21: goal in progress, reports compliance  Long Term Goals: To be accomplished in 8-12 treatments:  1) Patient will improve FOTO assessment score to 64 pts in order to indicate improved functional abilities.   2) Patient will improve cervical rotation to 65 deg KAUSHIK for ADLs 4/5/21: RR 70, LR 60, goal in progress  3) Patient will report worst neck/shoulder pain as 1/10 or less in order to progress toward personal goals. 4/19/21: goal not met, increase of pain to 4/10 after resuming computer work for school  4) Patient will report overall at least 75% improvement in function in order to progress toward premorbid status.   5) Patient will demonstrated ability to lift and carry 40+ lbs with proper form and no pain to care for her daughter- 4/5/21: initiated box lifts floor -> waist and box carries this visit, goal in progress       PLAN  []  Upgrade activities as tolerated yes Continue plan of care   []  Discharge due to :    []  Other:      Therapist: Reyna Crawford, ALESSIA    Date: 4/19/2021 Time: 2:46 PM     Future Appointments   Date Time Provider Chris Banegas   4/19/2021  6:00 PM Farrah Coto PT [facility]   4/21/2021  3:45 PM Delores Cardona, PTA [id number]   4/26/2021  6:00 PM Farrah Coto PT [facility]   4/28/2021  3:45 PM Delores Cardona, RADHA [facility]

## 2021-04-21 ENCOUNTER — HOSPITAL ENCOUNTER (OUTPATIENT)
Dept: PHYSICAL THERAPY | Age: 31
Discharge: HOME | End: 2021-04-21
Payer: MEDICAID

## 2021-04-21 PROCEDURE — 97530 THERAPEUTIC ACTIVITIES: CPT

## 2021-04-21 PROCEDURE — 97112 NEUROMUSCULAR REEDUCATION: CPT

## 2021-04-21 PROCEDURE — 97110 THERAPEUTIC EXERCISES: CPT

## 2021-04-21 NOTE — PROGRESS NOTES
PHYSICAL THERAPY - DAILY TREATMENT NOTE    Patient Name: Irvin Dixon        Date: 2021  : 1990   no Patient  Verified  Visit #:     Insurance: Payor: Cal Wilkes / Plan: Jarad Hess / Product Type: Managed Care Medicaid /      In time: 3:49 Out time: 4:52   Total Treatment Time: 63     Medicare/[employer] Time Tracking (below)   Total Timed Codes (min):  63 1:1 Treatment Time:       TREATMENT AREA =  Neck pain [M54.2]  Cervical radiculopathy [M54.12]    SUBJECTIVE  Pain Level (on 0 to 10 scale):  6  / 10   Medication Changes/New allergies or changes in medical history, any new surgeries or procedures?    no  If yes, update Summary List   Subjective Functional Status/Changes:  []  No changes reported   My neck is hurting a little bit more than usual today from doing a lot of work on the computer with my school work since I was here last.           OBJECTIVE      22 min Therapeutic Exercise:     Rationale: increase ROM, increase strength, improve coordination, improve balance, and increase proprioception to improve the patients ability to progress to functional activities limited by pain or other dysfunction     19 min Therapeutic Activity:     Rationale: to improve functional activities, model real life movements and performance specific to the patients need with supervision to return the patient to their PLOF      22 min Neuromuscular Re-education:     Rationale: exercises designed to improve and/or maintain balance, coordination, kinesthetic sense, posture, and/or proprioception for functional activities to improve the patients ability to function in daily life          Billed With/As:   [] TE   [] TA   [] Neuro   [] Self Care Patient Education: [x] Review HEP    [] Progressed/Changed HEP based on:   [] positioning   [] body mechanics   [] transfers   [] heat/ice application    [] other:      Other Objective/Functional Measures:       Post Treatment Pain Level (on 0 to 10) scale:   0  / 10     ASSESSMENT  Assessment/Changes in Function:   See Progress note/Physician update for full detailed progress towards established goals. [x]  See Progress Note/Recertification   Patient will continue to benefit from skilled PT services to modify and progress therapeutic interventions, address functional mobility deficits, address ROM deficits, address strength deficits, analyze and cue movement patterns, analyze and modify body mechanics/ergonomics, assess and modify postural abnormalities and instruct in home and community integration to attain remaining goals. Progress toward goals / Updated goals:  See Progress note/Physician update for full detailed progress towards established goals.      PLAN  [x]  Upgrade activities as tolerated yes Continue plan of care   []  Discharge due to :    []  Other:      Therapist: Esha Garza PTA    Date: 4/21/2021 Time: 3:48 PM     Future Appointments   Date Time Provider Chris Banegas   4/26/2021  6:00 PM Dariela Cheatham, PT [facility]   4/28/2021  3:45 PM Artelia Essex, PTA [facility]   5/3/2021  6:00 PM Dariela Cheatham, PT [facility]   5/5/2021  4:30 PM Artelia Essex, PTA [facility]   5/10/2021  6:00 PM Dariela Cheatham, PT [facility]

## 2021-04-21 NOTE — PROGRESS NOTES
[facility] PHYSICAL THERAPY  [address], [place], [address] - Phone: [phone]  Fax: [phone]  PROGRESS NOTE  Patient Name: Cherie Villarreal : 1990   Treatment/Medical Diagnosis: Neck pain [M54.2]  Cervical radiculopathy [M54.12]   Referral Source: Aurther Nyhan, NP     Date of Initial Visit: 3/25/21 Attended Visits: 5 Missed Visits: 2     SUMMARY OF TREATMENT  Therapeutic exercise for cervical mobility, postural awareness/strengthening, cervicothoracic stabilization and HEP. CURRENT STATUS  Pt reports 45% overall improvement in sx since beginning care. Pt had been making steady progress, reporting consistent 0/10 pain levels, however recently resumed schooling for her GED and has noticed significant increase in pain with prolonged computer work. Pt reports 8/10 max pain, 1-2/10 avg pain. Pain made worse with with prolonged sitting especially with computer use for > 4-5 hours with her school work. Improvements: slight improvement with bilateral cervical rotation mobility and decreased frequency and intensity of bilateral UE radicular symptoms with ADL's since initial evaluation. Remaining functional limitations:  prolonged sitting especially with computer use, lifting     Objective Measurements:  Cervical AROM= Flexion= 35 deg; Extension= 22 deg; Side bending= R= 30 deg, L=35 deg; Rotation= R= 80 deg; L= 53 deg    FOTO 56/100    Goal/Measure of Progress Goal Met? 1. Patient will improve FOTO assessment score to 64 pts in order to indicate improved functional abilities. Status at last Eval: 51/100 Current Status: 56/100 Progress    2. Patient will improve cervical rotation to 65 deg KAUSHIK for ADLs   Status at last Eval: RRot 60deg, LRot 50 deg Current Status: RRot 80deg, LRot 53 deg Partially Met   3.   Patient will report worst neck/shoulder pain as 1/10 or less in order to progress toward personal goals   Status at last Eval: 7/10 Current Status: 8/10 no       Goal/Measure of Progress Goal Met? 4. Patient will report overall at least 75% improvement in function in order to progress toward premorbid status. Status at last Eval: n/a Current Status: 45% overall improvement reported Progress   5. Patient will demonstrated ability to lift and carry 40+ lbs with proper form and no pain to care for her daughter   Status at last Eval: pain with all lifting Current Status: Pt has been performing lift and carry activities with 17 lb crate over the past 2-3  Progress     New Goals to be achieved in __8__  treatments: 1. Patient will improve FOTO assessment score to 64 pts in order to indicate improved functional abilities. 2 Patient will improve L cervical rotation within 5 degrees of R cervical rotation for increased symmetry with ADL's.  3. Patient will report worst neck/shoulder pain as 3/10 or less in order to progress toward personal goals  4. Patient will report overall at least 75% improvement in function in order to progress toward premorbid status. 5.Patient will demonstrated ability to lift and carry 40+ lbs with proper form and no pain to care for her daughter    RECOMMENDATIONS  Continue with current POC for 8 additional visits with advancing as tolerated, then reassess for the need for continuation or discharge from therapy. Pt has been educated extensively on work station ergonomics, postural awareness techniques, and importance of frequent rest breaks to avoid postural stressors w/ prolonged desk work. If you have any questions/comments please contact us directly at [phone]. Thank you for allowing us to assist in the care of your patient.     Therapist Signature: Emi Gonzalez PTA Date: 4/21/2021     Time: 3:55 PM   NOTE TO PHYSICIAN:  PLEASE COMPLETE THE ORDERS BELOW AND FAX TO   [facility]: [phone]  If you are unable to process this request in 24 hours please contact our office: [phone]    ___ I have read the above report and request that my patient continue as recommended.   ___ I have read the above report and request that my patient continue therapy with the following changes/special instructions:_________________________________________________________   ___ I have read the above report and request that my patient be discharged from therapy.      Physician Signature:        Date:       Time:       Casi Barone NP

## 2021-04-26 ENCOUNTER — HOSPITAL ENCOUNTER (OUTPATIENT)
Dept: PHYSICAL THERAPY | Age: 31
Discharge: HOME | End: 2021-04-26
Payer: MEDICAID

## 2021-04-26 PROCEDURE — 97110 THERAPEUTIC EXERCISES: CPT

## 2021-04-26 PROCEDURE — 97112 NEUROMUSCULAR REEDUCATION: CPT

## 2021-04-26 PROCEDURE — 97530 THERAPEUTIC ACTIVITIES: CPT

## 2021-04-26 NOTE — PROGRESS NOTES
PHYSICAL THERAPY - DAILY TREATMENT NOTE    Patient Name: Marino Ribera        Date: 2021  : 1990   yes Patient  Verified  Visit #:     Insurance: Payor: Aurelia Thomas / Plan: Vandana Pacheco / Product Type: Managed Care Medicaid /      In time: 6:00 Out time: 6:32   Total Treatment Time: 32     Medicare/BCBS Time Tracking (below)   Total Timed Codes (min):  na 1:1 Treatment Time:  na     TREATMENT AREA =  Neck pain [M54.2]  Cervical radiculopathy [M54.12]    SUBJECTIVE  Pain Level (on 0 to 10 scale):  0  / 10   Medication Changes/New allergies or changes in medical history, any new surgeries or procedures?    no  If yes, update Summary List   Subjective Functional Status/Changes:  []  No changes reported     \"I have no pain today but I didn't do any school work today\". Pt notes she has been breaking up her school work so she does not spend more than 1 hour at a time on the computer without a break (previously was doing 4 hours straight). Note she has been more mindful of her posture and has also purchased a lumbar roll of her chair.         OBJECTIVE    10 min Therapeutic Exercise:     Rationale: increase ROM, increase strength, improve coordination, improve balance, and increase proprioception to improve the patients ability to progress to functional activities limited by pain or other dysfunction     10 min Therapeutic Activity:     Rationale: to improve functional activities, model real life movements and performance specific to the patients need with supervision to return the patient to their PLOF      12 min Neuromuscular Re-education:     Rationale: exercises designed to improve and/or maintain balance, coordination, kinesthetic sense, posture, and/or proprioception for functional activities to improve the patients ability to function in daily life            Billed With/As:   [x] TE   [] TA   [] Neuro   [] Self Care Patient Education: [x] Review HEP    [] Progressed/Changed HEP based on:   [] positioning   [] body mechanics   [] transfers   [] heat/ice application    [] other:      Other Objective/Functional Measures:    Withheld ulnar n mobs 2' (-) ULTT  Onset of N/T during B UT stretching; resolved once stretch complete  Added seated slouch-overcorrect holds for postural stability     Post Treatment Pain Level (on 0 to 10) scale:   0  / 10     ASSESSMENT  Assessment/Changes in Function:     Pt denied pain post tx session. Continues to require heavy cueing for posture, with tendency for excessive UT compensation. Pt demo poor lifting mechanics floor to waist w/ minimal knee flexion until cued. []  See Progress Note/Recertification   Patient will continue to benefit from skilled PT services to modify and progress therapeutic interventions, address functional mobility deficits, address ROM deficits, address strength deficits, analyze and address soft tissue restrictions, analyze and cue movement patterns, analyze and modify body mechanics/ergonomics, assess and modify postural abnormalities and instruct in home and community integration to attain remaining goals. Progress toward goals / Updated goals: 1. Patient will improve FOTO assessment score to 64 pts in order to indicate improved functional abilities. 2 Patient will improve L cervical rotation within 5 degrees of R cervical rotation for increased symmetry with ADL's.  3. Patient will report worst neck/shoulder pain as 3/10 or less in order to progress toward personal goals 4/26/21: sx continue to be exaccerbated by prolonged computer work; 0/10 pain when not using computer  4. Patient will report overall at least 75% improvement in function in order to progress toward premorbid status.   5.Patient will demonstrated ability to lift and carry 40+ lbs with proper form and no pain to care for her daughter       PLAN  []  Upgrade activities as tolerated yes Continue plan of care   []  Discharge due to : []  Other:      Therapist: Eduin Jeffries PT    Date: 4/26/2021 Time: 2:38 PM     Future Appointments   Date Time Provider Chris Makenzie   4/26/2021  6:00 PM Laron Shelton, PT [facility]   4/28/2021  3:45 PM Brock Scott, PTA [facility]   5/3/2021  6:00 PM Destiney AVILA PT [facility]   5/5/2021  4:30 PM Brock Scott, PTA [facility]   5/10/2021  6:00 PM Laron Shelton, PT [facility]

## 2021-04-28 ENCOUNTER — HOSPITAL ENCOUNTER (OUTPATIENT)
Dept: PHYSICAL THERAPY | Age: 31
Discharge: HOME | End: 2021-04-28
Payer: MEDICAID

## 2021-04-28 PROCEDURE — 97110 THERAPEUTIC EXERCISES: CPT

## 2021-04-28 PROCEDURE — 97112 NEUROMUSCULAR REEDUCATION: CPT

## 2021-04-28 PROCEDURE — 97530 THERAPEUTIC ACTIVITIES: CPT

## 2021-04-28 NOTE — PROGRESS NOTES
PHYSICAL THERAPY - DAILY TREATMENT NOTE    Patient Name: Delma Dec        Date: 2021  : 1990   yes Patient  Verified  Visit #:     Insurance: Payor: Diandra Nieves / Plan: Vilma Mock / Product Type: Managed Care Medicaid /      In time: 3:48 Out time: 4:20   Total Treatment Time: 32     Medicare/[facility] Time Tracking (below)   Total Timed Codes (min):  32 1:1 Treatment Time:       TREATMENT AREA =  Neck pain [M54.2]  Cervical radiculopathy [M54.12]    SUBJECTIVE  Pain Level (on 0 to 10 scale):  0  / 10   Medication Changes/New allergies or changes in medical history, any new surgeries or procedures?    no  If yes, update Summary List   Subjective Functional Status/Changes:  []  No changes reported   I have been feeling pretty good since I was here last,I have mainly just been feeling some tingling down to my hands especially when I forget about my posture.           OBJECTIVE      10 min Therapeutic Exercise:     Rationale: increase ROM, increase strength, improve coordination, improve balance, and increase proprioception to improve the patients ability to progress to functional activities limited by pain or other dysfunction     10 min Therapeutic Activity:     Rationale: to improve functional activities, model real life movements and performance specific to the patients need with supervision to return the patient to their PLOF      12 min Neuromuscular Re-education:     Rationale: exercises designed to improve and/or maintain balance, coordination, kinesthetic sense, posture, and/or proprioception for functional activities to improve the patients ability to function in daily life          Billed With/As:   [] TE   [] TA   [] Neuro   [] Self Care Patient Education: [x] Review HEP    [] Progressed/Changed HEP based on:   [] positioning   [] body mechanics   [] transfers   [] heat/ice application    [] other:      Other Objective/Functional Measures:       Post Treatment Pain Level (on 0 to 10) scale:   0  / 10     ASSESSMENT  Assessment/Changes in Function:   Pt presented with only c/o intermittent bilateral UE tingling/paresthesia down to her hands randomly with various activities especially noted with forgetting about her posture since last treatment. Pt however, presented with increased postural awareness during treatment today. Will continue to progress/advance patient within current POC as tolerated with monitoring symptoms. []  See Progress Note/Recertification   Patient will continue to benefit from skilled PT services to modify and progress therapeutic interventions, address functional mobility deficits, address ROM deficits, address strength deficits, analyze and cue movement patterns, analyze and modify body mechanics/ergonomics, assess and modify postural abnormalities and instruct in home and community integration to attain remaining goals. Progress toward goals / Updated goals:  . Patient will improve FOTO assessment score to 64 pts in order to indicate improved functional abilities.    2 Patient will improve L cervical rotation within 5 degrees of R cervical rotation for increased symmetry with ADL's.  3. Patient will report worst neck/shoulder pain as 3/10 or less in order to progress toward personal goals 4/26/21: sx continue to be exaccerbated by prolonged computer work; 0/10 pain when not using computer  4. Patient will report overall at least 75% improvement in function in order to progress toward premorbid status.   5.Patient will demonstrated ability to lift and carry 40+ lbs with proper form and no pain to care for her daughter     PLAN  [x]  Upgrade activities as tolerated yes Continue plan of care   []  Discharge due to :    []  Other:      Therapist: Jonathan Garcia PTA    Date: 4/28/2021 Time: 4:02 PM     Future Appointments   Date Time Provider Chris Banegas   5/3/2021  6:00 PM Bronson Guzman, PT [facility]   5/5/2021  4:30 PM Gracie Latif Ashley Lozoya, PTA [id number] [facility]   5/10/2021  6:00 PM Flory Robin, PT [facility]

## 2021-05-03 ENCOUNTER — HOSPITAL ENCOUNTER (OUTPATIENT)
Dept: PHYSICAL THERAPY | Age: 31
Discharge: HOME | End: 2021-05-03
Payer: MEDICAID

## 2021-05-03 PROCEDURE — 97530 THERAPEUTIC ACTIVITIES: CPT

## 2021-05-03 PROCEDURE — 97112 NEUROMUSCULAR REEDUCATION: CPT

## 2021-05-03 PROCEDURE — 97110 THERAPEUTIC EXERCISES: CPT

## 2021-05-03 NOTE — PROGRESS NOTES
PHYSICAL THERAPY - DAILY TREATMENT NOTE    Patient Name: José Hurtado        Date: 5/3/2021  : 1990   yes Patient  Verified  Visit #:   8     Insurance: Payor: Consuelo Donovan / Plan: Daiana Soto / Product Type: Managed Care Medicaid /      In time: 5:55 Out time: 6:28   Total Treatment Time: 33     Medicare/BCBS Time Tracking (below)   Total Timed Codes (min):  na 1:1 Treatment Time:  na     TREATMENT AREA =  Neck pain [M54.2]  Cervical radiculopathy [M54.12]    SUBJECTIVE  Pain Level (on 0 to 10 scale):  0  / 10   Medication Changes/New allergies or changes in medical history, any new surgeries or procedures?    no  If yes, update Summary List   Subjective Functional Status/Changes:  []  No changes reported     \"I haven't been lifting my daughter as much because we are trying to teach her how to transfer herself. \" Pt reports having inc mid back pain and B UE N/T after sweeping on Saturday; laid down and went to bed and woke next day with absent sx.           OBJECTIVE    11 min Therapeutic Exercise:     Rationale: increase ROM, increase strength, improve coordination, improve balance, and increase proprioception to improve the patients ability to progress to functional activities limited by pain or other dysfunction     10 min Therapeutic Activity:     Rationale: to improve functional activities, model real life movements and performance specific to the patients need with supervision to return the patient to their PLOF      12 min Neuromuscular Re-education:     Rationale: exercises designed to improve and/or maintain balance, coordination, kinesthetic sense, posture, and/or proprioception for functional activities to improve the patients ability to function in daily life    Billed With/As:   [] TE   [] TA   [] Neuro   [] Self Care Patient Education: [x] Review HEP    [] Progressed/Changed HEP based on:   [] positioning   [] body mechanics   [] transfers   [] heat/ice application    [] other:      Other Objective/Functional Measures: Added Zercher squats to simulate cradle carry; inc UT compensation requiring cueing to reduce  Cont to req heavy verbal cueing for scapular depression throughout session     Post Treatment Pain Level (on 0 to 10) scale:   0  / 10     ASSESSMENT  Assessment/Changes in Function:     Pt reported onset of N/T when performing 2nd set of levator scap stretch B, resolved within 2 minutes of releasing stretch. Pt reports compliance to posture mechanics and rest breaks with prolonged computer work, which has contributed to reduced sx/pain overall     []  See Progress Note/Recertification   Patient will continue to benefit from skilled PT services to modify and progress therapeutic interventions, address functional mobility deficits, address ROM deficits, address strength deficits, analyze and address soft tissue restrictions, analyze and cue movement patterns, analyze and modify body mechanics/ergonomics, assess and modify postural abnormalities and instruct in home and community integration to attain remaining goals. Progress toward goals / Updated goals: 1. Patient will improve FOTO assessment score to 64 pts in order to indicate improved functional abilities.    2 Patient will improve L cervical rotation within 5 degrees of R cervical rotation for increased symmetry with ADL's.  3. Patient will report worst neck/shoulder pain as 3/10 or less in order to progress toward personal goals 4/26/21: sx continue to be exaccerbated by prolonged computer work; 0/10 pain when not using computer  4. Patient will report overall at least 75% improvement in function in order to progress toward premorbid status.   5.Patient will demonstrated ability to lift and carry 40+ lbs with proper form and no pain to care for her daughter 5/3/21: goal in progress, requires cueing for proper form when completing box lifts/carries and zercher squats in clinic     PLAN  []  Upgrade activities as tolerated yes Continue plan of care   []  Discharge due to :    []  Other:      Therapist: Rocío Velasco PT    Date: 5/3/2021 Time: 4:32 PM     Future Appointments   Date Time Provider Chris Banegas   5/3/2021  6:00 PM Brannon Campovedre, ALESSIA [facility]   5/5/2021  4:30 PM Luly Ocampo PTA [facility]   5/10/2021  6:00 PM Brannon Campoverde PT [facility]

## 2021-05-05 ENCOUNTER — APPOINTMENT (OUTPATIENT)
Dept: PHYSICAL THERAPY | Age: 31
End: 2021-05-05
Payer: MEDICAID

## 2021-05-10 ENCOUNTER — HOSPITAL ENCOUNTER (OUTPATIENT)
Dept: PHYSICAL THERAPY | Age: 31
Discharge: HOME | End: 2021-05-10
Payer: MEDICAID

## 2021-05-10 PROCEDURE — 97112 NEUROMUSCULAR REEDUCATION: CPT

## 2021-05-10 PROCEDURE — 97110 THERAPEUTIC EXERCISES: CPT

## 2021-05-10 PROCEDURE — 97530 THERAPEUTIC ACTIVITIES: CPT

## 2021-05-10 NOTE — PROGRESS NOTES
PHYSICAL THERAPY - DAILY TREATMENT NOTE    Patient Name: Cory Pierson        Date: 5/10/2021  : 1990   yes Patient  Verified  Visit #:     Insurance: Payor: Caryl Blood / Plan: Moody Hudson / Product Type: Managed Care Medicaid /      In time: 6:00 Out time: 6:32   Total Treatment Time: 42     Medicare/BCBS Time Tracking (below)   Total Timed Codes (min):  na 1:1 Treatment Time:  na     TREATMENT AREA =  Neck pain [M54.2]  Cervical radiculopathy [M54.12]    SUBJECTIVE  Pain Level (on 0 to 10 scale):  4  / 10   Medication Changes/New allergies or changes in medical history, any new surgeries or procedures?    no  If yes, update Summary List   Subjective Functional Status/Changes:  []  No changes reported     \"I had to do all the cooking and cleaning for Mother's Day yesterday and I was on my feet a lot so I am more sore today. I usually don't do that much on the weekends so I am feeling it today.  I rested all day today\"          OBJECTIVE  10 min Therapeutic Exercise:     Rationale: increase ROM, increase strength, improve coordination, improve balance, and increase proprioception to improve the patients ability to progress to functional activities limited by pain or other dysfunction     12 min Therapeutic Activity:     Rationale: to improve functional activities, model real life movements and performance specific to the patients need with supervision to return the patient to their PLOF      10 min Neuromuscular Re-education:     Rationale: exercises designed to improve and/or maintain balance, coordination, kinesthetic sense, posture, and/or proprioception for functional activities to improve the patients ability to function in daily life            Billed With/As:   [x] TE   [] TA   [] Neuro   [] Self Care Patient Education: [x] Review HEP    [] Progressed/Changed HEP based on:   [] positioning   [] body mechanics   [] transfers   [] heat/ice application    [] other: Other Objective/Functional Measures:    Performed weighted carries in zercher hold x 25' @ 27# to simulate carrying child; inc low back pain after 20', cues to reduce lumbar extension compensation  Denied N/T during neck stretches this visit     Post Treatment Pain Level (on 0 to 10) scale:   3  / 10     ASSESSMENT  Assessment/Changes in Function:     Pt presented w/ inc pain due to over-activity this weekend. Despite inc pain, denied N/T since her last visit. Pt ed on activity modification and reminder for core activation when performing heavy household chores. Pt notes she does not have to lift her daughter as much anymore, which has been helping her back. Formal reassessment at [address] for MD PN/DC. []  See Progress Note/Recertification   Patient will continue to benefit from skilled PT services to modify and progress therapeutic interventions, address functional mobility deficits, address ROM deficits, address strength deficits, analyze and address soft tissue restrictions, analyze and cue movement patterns, analyze and modify body mechanics/ergonomics, assess and modify postural abnormalities and instruct in home and community integration to attain remaining goals. Progress toward goals / Updated goals: 1. Patient will improve FOTO assessment score to 64 pts in order to indicate improved functional abilities.    2 Patient will improve L cervical rotation within 5 degrees of R cervical rotation for increased symmetry with ADL's.  3. Patient will report worst neck/shoulder pain as 3/10 or less in order to progress toward personal goals 4/26/21: sx continue to be exaccerbated by prolonged computer work; 0/10 pain when not using computer; 5/10/21: goal not met, pain inc to 4/10 (highest pain in 1 month) due to over-activity  4. Patient will report overall at least 75% improvement in function in order to progress toward premorbid status.   5.Patient will demonstrated ability to lift and carry 40+ lbs with proper form and no pain to care for her daughter 5/3/21: goal in progress, requires cueing for proper form when completing box lifts/carries and zercher squats in clinic     PLAN  []  Upgrade activities as tolerated yes Continue plan of care   []  Discharge due to :    []  Other:      Therapist: Haroon Meadows PT    Date: 5/10/2021 Time: 4:27 PM     Future Appointments   Date Time Provider Chris Banegas   5/10/2021  6:00 PM Jean Trinh PT [facility]

## 2021-05-17 ENCOUNTER — HOSPITAL ENCOUNTER (OUTPATIENT)
Dept: PHYSICAL THERAPY | Age: 31
Discharge: HOME | End: 2021-05-17
Payer: MEDICAID

## 2021-05-17 PROCEDURE — 97530 THERAPEUTIC ACTIVITIES: CPT

## 2021-05-17 PROCEDURE — 97112 NEUROMUSCULAR REEDUCATION: CPT

## 2021-05-17 PROCEDURE — 97110 THERAPEUTIC EXERCISES: CPT

## 2021-05-17 NOTE — PROGRESS NOTES
PHYSICAL THERAPY - DAILY TREATMENT NOTE    Patient Name: Mel Scales        Date: 2021  : 1990   yes Patient  Verified  Visit #:     Insurance: Payor: Vivienne Regan / Plan: Daja García / Product Type: Managed Care Medicaid /      In time: 4:02 Out time: 5:00   Total Treatment Time: 58     Medicare/BCBS Time Tracking (below)   Total Timed Codes (min):   1:1 Treatment Time:       TREATMENT AREA =  Neck pain [M54.2]  Cervical radiculopathy [M54.12]    SUBJECTIVE  Pain Level (on 0 to 10 scale):  0  / 10   Medication Changes/New allergies or changes in medical history, any new surgeries or procedures? yes  If yes, update Summary List   Subjective Functional Status/Changes:  []  No changes reported   I know that my neck and back are both getting better because I could sweep with less pain earlier today and it only really bothers me when I use the computer for too long lately.            OBJECTIVE      20 min Therapeutic Exercise:     Rationale: increase ROM, increase strength, improve coordination, improve balance, and increase proprioception to improve the patients ability to progress to functional activities limited by pain or other dysfunction     19 min Therapeutic Activity:     Rationale: to improve functional activities, model real life movements and performance specific to the patients need with supervision to return the patient to their PLOF      19 min Neuromuscular Re-education:     Rationale: exercises designed to improve and/or maintain balance, coordination, kinesthetic sense, posture, and/or proprioception for functional activities to improve the patients ability to function in daily life          Billed With/As:   [] TE   [] TA   [] Neuro   [] Self Care Patient Education: [x] Review HEP    [] Progressed/Changed HEP based on:   [] positioning   [] body mechanics   [] transfers   [] heat/ice application    [] other:      Other Objective/Functional Measures:         Post Treatment Pain Level (on 0 to 10) scale:   0  / 10     ASSESSMENT  Assessment/Changes in Function:   See discharge summary for full final detailed progress towards all established goals. []  See Progress Note/Recertification      Progress toward goals / Updated goals:  See discharge summary for full final detailed progress towards all established goals. PLAN  []  Upgrade activities as tolerated no Continue plan of care   [x]  Discharge due to : Patient has achieved maximum medical benefit/potential from current POC after completing 10 of 12 prescribed visits and is ready for discharge from therapy at this time. []  Other:      Therapist: Marilia Powell PTA    Date: 5/17/2021 Time: 4:20 PM     No future appointments.

## 2021-05-17 NOTE — PROGRESS NOTES
[facility] PHYSICAL THERAPY  [address], [place], [address] - Phone: [phone]  Fax: [phone]  DISCHARGE SUMMARY  Patient Name: Gin Knox : 1990   Treatment/Medical Diagnosis: Neck pain [M54.2]  Cervical radiculopathy [M54.12]   Referral Source: Consuelo Smith NP     Date of Initial Visit: 3/25/21 Attended Visits: 10 Missed Visits: 3     SUMMARY OF TREATMENT  Therapeutic exercise for cervical mobility, postural awareness/strengthening, cervicothoracic stabilization and HEP. CURRENT STATUS  Pt reports 75% overall improvement in sx since start of care. Notes max pain 8/10 with prolonged computer use > 4 hours as well as prolonged house cleaning, avg pain 3/10. Pt has made slow but steady progress with gaining cervical mobility as well as advancing with strengthening and cervicothoracic stabilization within current POC. Pt however has plateaued with progress over the past few treatments with only increase/excerbation of pain with prolonged computer use > 4 hours as well as prolonged house cleaning,       Improvements: Pt reports the most functional improvement with decreased pain/increased tolerance with overhead active and repetitive reaching ADL's   Remaining Functional limitations:  Limitations/increased pain with prolonged computer use > 4 hours as well as prolonged house cleaning,     Objective:   Cervical AROM= Flexion= 42 deg; Extension= 38 deg; Side bending= R= 37 deg, L=41 deg; Rotation= R= 78 deg; L= 63 deg     FOTO 56/100    Goal/Measure of Progress Goal Met? 1. Patient will improve FOTO assessment score to 64 pts in order to indicate improved functional abilities. Status at last Eval: 56/100 Current Status: 56/100 no   2. Patient will improve L cervical rotation within 5 degrees of R cervical rotation for increased symmetry with ADL's.    Status at last Eval: RRot 80 deg, LRot 53 deg Current Status: RRot 78deg, LRot 63 deg Improved, but not met   3. Patient will report worst neck/shoulder pain as 3/10 or less in order to progress toward personal goals   Status at last Eval: 8/10 made worse with prolonged sitting especially with computer use for > 4-5 hours with her school work. Current Status: 8/10made worse with prolonged computer use > 4 hours as well as prolonged house cleaning no     Goal/Measure of Progress Goal Met? 4. Patient will report overall at least 75% improvement in function in order to progress toward premorbid status. Status at last Eval: 45% overall improvement reported Current Status: 75% overall improvement reported yes   5. Patient will demonstrated ability to lift and carry 40+ lbs with proper form and no pain to care for her daughter   Status at last Eval: Pt has been performing lift and carry activities with 17 lb crate over the past 2-3  Current Status: Met with ability to lift and carry 40 lb crate for 75 ft without increased pain today yes     RECOMMENDATIONS  Patient has achieved maximum medical benefit/potential from current POC after completing 10 of 12 prescribed visits and is ready for discharge from therapy at this time. If you have any questions/comments please contact us directly at [phone]. Thank you for allowing us to assist in the care of your patient. Therapist Signature: Teresa Ervin PTA Date: 5/17/21    Sheldon Rizzo PT, DPT, CMTPT Time: 4:29 PM   NOTE TO PHYSICIAN:  Your patient's insurance requires this discharge note be signed and returned. PLEASE COMPLETE THE ORDERS BELOW AND RETURN TO:  [facility] PHYSICAL [facility]ERAP[facility] @ [phone]    ___ I have read the above report and request that my patient be discharged from therapy.      Physician Signature:        Date:       Time:                       Navdeep Nieves NP

## 2021-05-17 NOTE — PROGRESS NOTES
Physical Therapy Discharge Instructions  [facility]  [facility]  [address], [address]  P: [phone] F: [phone]    Patient: Jose Tidwell  : 1990    Reason for Discharge From PT:  [x]Met/progressing towards all set goals    []Minimal progress made towards set goals    []Met a plateau in progress/improvement    []Insurance/financial issues    []Other:     Recommendations:   [x] Return to activity with home program as prescribed on print-outs    [] Return to activity with the following modifications:     [] [employer] fitness training     [] Return to/join local gym    [] Other:      Continue with      [] Ice [x] Heat   as needed for 10-15 minutes to relieve pain  *If pain does not improve after several days, follow-up with your physician for a consult*           Follow up with MD:     [] Upon completion of therapy   [] As needed      Additional Comments: Great Job! It was great working with you! Thank you for choosing [facility] [place] for your PT needs!       Mahendra Heath, RADHA 2021 4:51 PM

## 2021-06-10 ENCOUNTER — HOSPITAL ENCOUNTER (OUTPATIENT)
Dept: PHYSICAL THERAPY | Age: 31
Discharge: HOME | End: 2021-06-10
Payer: MEDICAID

## 2021-06-10 PROCEDURE — 97162 PT EVAL MOD COMPLEX 30 MIN: CPT

## 2021-06-10 NOTE — PROGRESS NOTES
[address] PHYS[facility] THER[facility] AT [address], [address] [place] [address] Road  Phone: [phone]   [phone]  PLAN OF CARE / [address] [place] [address] PHYSICAL THERAPY SERVICES  Patient Name: Josie Schmidt : 1990   Medical   Diagnosis: Dorsalgia of lumbosacral region [M54.5] Treatment Diagnosis: Dorsalgia of lumbosacral region [M54.5]   Onset Date: 2020     Referral Source: Domitila Welch MD [place] [facility]): 6/10/2021   Prior Hospitalization: See medical history Provider #: [id number]   Prior Level of Function: I in all functional mobility, able to lift, bend, carry, sit without back pain   Comorbidities: Diabetes, HTN, thyroid problems, asthma   Medications: Verified on Patient Summary List   The Plan of Care and following information is based on the information from the initial evaluation.   ===========================================================================================  Assessment / key information:  Pt is a 26 y.o. F who presents with low back pain. Current deficits include: dec hip ROM, dec hip strength, dec dynamic hip stability, dec core stability. Functional deficits include: impaired bending, lifting carrying, sitting, and laying on sides. FOTO score indicates 56% functional impairment. Home exercise program initiated on initial evaluation to address these deficits. Pt would benefit from PT to address these deficits for increased functional mobility and QOL. SHERWIN: Pt reports that she is not sure what happened to her back, it started hurting in December. She thinks it may be due to having to lift her special needs child who is wheelchair bound for about a year prior to get into the house before they got a ramp installed. She saw her MD who referred her to PT. She describes the pain in her back as an ache.  The pain is worse with being bent over (diaper changes and house cleaning), prolonged sitting, and lifting/carrying her child (42#) and/or wheel chair. She also cant lay on her side due to back pain. The pain is better with laying down and muscle relaxers. She denies all red flags and parasthesias  She would like to get her back better so she can go back to work as a  as able. OBJECTIVE:    PAIN:  Location- low back  Current-4/10  Best-0/10  Worst- 10/10  Alleviating factors: laying down, muscle relaxers  Aggravating factors: lifting/carrying, bending, sitting, laying on side    MMT:  Hip   -flex L=3/5! R=3/5!  -ext L=3/5 R=3/5  -abd L=3/5! R=3/5! Knee  -flex L=3+/5 R=3+/5  -ext L=4/5 R=4/5    Sensation: normal    Posture: flattened lumbar spine, pelvic posterior tilt    Balance: SLS ~10s KAUSHIK, with contralateral hip drop KAUSHIK    AROM:  Lumbar  Flexion- fingertips to floor, pain with return to standing. Extension- 75% ROM  Rotation R-  50% ROM mild pain   Rotation L- 50% ROM mild pain L>R  Sidebend R- wnl   Sidebend L- wnl  Hip  Flexion SLR L=45 deg, R= 57 deg  Extension-  L= -5 deg, R=0 deg    Outcome Measure: FOTO= 44    Palpation for Tenderness: TTP of KAUSHIK SI jt L>R, kaushik lumbar multifidus, R iliolumbar ligament, KAUSHIK piriformis    Special Tests:  FABERs- positive KAUSHIK L>R  Slump Test- positive on L  Elys - positive KAUSHIK    ===========================================================================================  Eval Complexity: History HIGH Complexity :3+ comorbidities / personal factors will impact the outcome/ POC ;  Examination  MEDIUM Complexity : 3 Standardized tests and measures addressing body structure, function, activity limitation and / or participation in recreation ; Presentation MEDIUM Complexity : Evolving with changing characteristics ;   Decision Making MEDIUM Complexity : FOTO score of 26-74; Overall Complexity MEDIUM  Problem List: pain affecting function, decrease ROM, decrease strength, impaired gait/ balance, decrease ADL/ functional abilitiies, decrease activity tolerance, decrease flexibility/ joint mobility and decrease transfer abilities   Treatment Plan may include any combination of the following: Therapeutic exercise, Therapeutic activities, Neuromuscular re-education, Physical agent/modality, Gait/balance training, Manual therapy, Patient education, Self Care training, Functional mobility training, Home safety training and Stair training  Patient / Family readiness to learn indicated by: asking questions  Persons(s) to be included in education: patient (P)  Barriers to Learning/Limitations: None  Measures taken, if barriers to learning:    Patient Goal (s): \"feel better\"   Patient self reported health status: fair  Rehabilitation Potential: good    Short Term Goals: To be accomplished in 1 weeks:  1) Goal: Patient will be independent and compliant with HEP in order to progress toward long term goals. Status at last note/certification: issued and reviewed  Long Term Goals: To be accomplished in 8-12 treatments:  1) Goal: Patient will improve FOTO assessment score to 60 pts in order to indicate improved functional abilities. Status at last note/certification: 44 pts  2) Goal: Patient will improve KAUSHIK hip extension to 20 deg for improved posture and gait mechanics  Status at last note/certification:  L= -5 deg, R=0 deg  3) Goal: Patient will report worst back pain as 2/10 or less in order to progress toward personal goals. Status at last note/certification: 40/96  4) Goal: Patient will report overall at least 65% improvement in function in order to progress toward premorbid status. Status at last note/certification: n/a  5) Goal: Patient will demonstrate #25+ floor to chest lift with proper lifting technique to care for her daughter.   Status at last note/certification: Pain with lifting/carrying daughter  Frequency / Duration:   Patient to be seen  1-2  times per week for 4-6  weeks:  Patient / Caregiver education and instruction: exercises  Therapist Signature: Vanessa Murrieta Date: 3/97/3406   Certification Period: na Time: 3:03 PM   ===========================================================================================  I certify that the above Physical Therapy Services are being furnished while the patient is under my care. I agree with the treatment plan and certify that this therapy is necessary. Physician Signature:        Date:       Time:        Prasanth Sprague MD  Please sign and return to [facility] AT [facility] UN[facility] or you may fax the signed copy to [phone]. Thank you.

## 2021-06-10 NOTE — PROGRESS NOTES
PT DAILY TREATMENT NOTE     Patient Name: José Hurtado  Date:6/10/2021  : 1990  [x]  Patient  Verified  Payor: Consuelo Donovan / Plan: Daiana Soto / Product Type: Managed Care Medicaid /    In time:505p  Out time:545p  Total Treatment Time (min): 40  Visit #: 1 of     Medicare/BCBS Only   Total Timed Codes (min):  7 1:1 Treatment Time:  40       Treatment Area: Dorsalgia of lumbosacral region [M54.5]    SUBJECTIVE  Pain Level (0-10 scale): 4  Any medication changes, allergies to medications, adverse drug reactions, diagnosis change, or new procedure performed?: [x] No    [] Yes (see summary sheet for update)  Subjective functional status/changes:   [] No changes reported  Pt initial eval see POC for details    OBJECTIVE      33 min [x]Eval                  []Re-Eval       7 min Therapeutic Exercise:  [] See flow sheet :   Rationale: increase ROM and increase strength to improve the patient's ability to lift, carry, and bend            With   [] TE   [] TA   [] neuro   [] other: Patient Education: [x] Review HEP    [] Progressed/Changed HEP based on:   [] positioning   [] body mechanics   [] transfers   [] heat/ice application    [] other:      Other Objective/Functional Measures:    Justification for Eval Code Complexity:  Patient History : see POC   Examination see exam   Clinical Presentation: evolving  Clinical Decision Making : FOTO : 44/100    Pain Level (0-10 scale) post treatment: 4    ASSESSMENT/Changes in Function: Pt was instructed in initial HEP required demo, vc, and tc for all exercises to perform correctly. Pt was given hand out detailing exercises and instructed in modification of exercises to tolerance, and in performing exercises safely. Pt verbalized understanding.         Patient will continue to benefit from skilled PT services to modify and progress therapeutic interventions, address functional mobility deficits, address ROM deficits, address strength deficits, analyze and address soft tissue restrictions, analyze and cue movement patterns, analyze and modify body mechanics/ergonomics, assess and modify postural abnormalities, address imbalance/dizziness and instruct in home and community integration to attain remaining goals.      [x]  See Plan of Care  []  See progress note/recertification  []  See Discharge Summary         Progress towards goals / Updated goals:  No progress as goals were set today    PLAN  []  Upgrade activities as tolerated     []  Continue plan of care  []  Update interventions per flow sheet       []  Discharge due to:_  []  Other:_          Eun Starkey 6/10/2021  3:04 PM    Future Appointments   Date Time Provider Chris Banegas   6/10/2021  5:00 PM Janes [facility]

## 2021-06-23 ENCOUNTER — HOSPITAL ENCOUNTER (OUTPATIENT)
Dept: PHYSICAL THERAPY | Age: 31
Discharge: HOME | End: 2021-06-23
Payer: MEDICAID

## 2021-06-23 PROCEDURE — 97112 NEUROMUSCULAR REEDUCATION: CPT

## 2021-06-23 PROCEDURE — 97530 THERAPEUTIC ACTIVITIES: CPT

## 2021-06-23 PROCEDURE — 97110 THERAPEUTIC EXERCISES: CPT

## 2021-06-23 NOTE — PROGRESS NOTES
PT DAILY TREATMENT NOTE     Patient Name: Yamilex Velásquez  Date:2021  : 1990  [x]  Patient  Verified  Payor: David Mittal / Plan: Christine  / Product Type: Managed Care Medicaid /    In time: 6:00  Out time: 6:42  Total Treatment Time (min): 42  Visit #: 2 of -    Medicare/BCBS Only   Total Timed Codes (min):  42 1:1 Treatment Time:  42       Treatment Area: Dorsalgia of lumbosacral region [M54.5]    SUBJECTIVE  Pain Level (0-10 scale): 3-4/10  Any medication changes, allergies to medications, adverse drug reactions, diagnosis change, or new procedure performed?: [x] No    [] Yes (see summary sheet for update)  Subjective functional status/changes:   [] No changes reported  Patient reports pain in her back and right hip. No major changes since evaluation. She reports 1-2x per week compliance with HEP, she has been working out more.      OBJECTIVE    10 min Therapeutic Exercise:  [x] See flow sheet :   Rationale: increase ROM and increase strength to improve the patient's ability to lift, carry, and bend    15 min Therapeutic Activity:  [x] See flow sheet :   Rationale: increase ROM and increase strength to improve the patient's ability to lift, carry, and bend    10 min Neuromuscular Re-education:  [x] See flow sheet :   Rationale: increase ROM and increase strength to improve the patient's ability to lift, carry, and bend       7 min Manual Therapy: STM/DTM and TrPR to right QL and SIJ; MET and shotgun for correction of left AI/right PI rotation  Technique:       [x] STM []ASTM [x] TPR []PROM [] Stretching  [] Jt manipulation []Gr I [] II []  III [] IV [] V []  Treatment Area: right SIJ, right QL  Other:     Rationale: decrease pain, increase tissue extensibility and decrease trigger points to allow patient to perform functional ADLs and correct pelvic alignment       With   [x] TE   [x] TA   [x] neuro   [] other: Patient Education: [x] Review HEP    [] Progressed/Changed HEP based on:   [] positioning   [] body mechanics   [] transfers   [] heat/ice application    [] other:      Other Objective/Functional Measures:   - demo and cuing 100%  - pain with bridges  - left AI/right PI rotation  - use of towel at ankles to increase stretch for psoas stretch  - use of towel for piriformis stretch   - good form with hip hinge and squats    Pain Level (0-10 scale) post treatment: 0/10    ASSESSMENT/Changes in Function:   Initiated PT POC today per flow sheet, requiring vc and demo 100% of the time for proper form and technique with TE. Patient found to have left AI/right PI rotation and trigger points along right SIJ. Patient will continue to benefit from skilled PT services to modify and progress therapeutic interventions, address functional mobility deficits, address ROM deficits, address strength deficits, analyze and address soft tissue restrictions, analyze and cue movement patterns, analyze and modify body mechanics/ergonomics, assess and modify postural abnormalities, address imbalance/dizziness and instruct in home and community integration to attain remaining goals. [x]  See Plan of Care  []  See progress note/recertification  []  See Discharge Summary         Progress towards goals / Updated goals:  Short Term Goals: To be accomplished in 1 weeks:  1) Goal: Patient will be independent and compliant with HEP in order to progress toward long term goals. Status at last note/certification: issued and reviewed  Current: pt reports 1-2x weekly compliance. 6/23/21  Long Term Goals: To be accomplished in 8-12 treatments:  1) Goal: Patient will improve FOTO assessment score to 60 pts in order to indicate improved functional abilities.   Status at last note/certification: 44 pts  2) Goal: Patient will improve KAUSHIK hip extension to 20 deg for improved posture and gait mechanics  Status at last note/certification:  L= -5 deg, R=0 deg  3) Goal: Patient will report worst back pain as 2/10 or less in order to progress toward personal goals. Status at last note/certification: 63/51  4) Goal: Patient will report overall at least 65% improvement in function in order to progress toward premorbid status. Status at last note/certification: n/a  5) Goal: Patient will demonstrate #25+ floor to chest lift with proper lifting technique to care for her daughter.   Status at last note/certification: Pain with lifting/carrying daughter    PLAN  []  Upgrade activities as tolerated     [x]  Continue plan of care  []  Update interventions per flow sheet       []  Discharge due to:_  []  Other:_          Lezlie Dakin, LPTA 6/23/2021  6:42 PM    Future Appointments   Date Time Provider Chris Banegas   6/23/2021  6:00 PM Sunitha [facility]   6/25/2021 11:30 AM Sunitha Chua [facility]   7/7/2021  5:15 PM Erin Espitia PTA [facility]   7/9/2021  3:30 PM Erin Espitia PTA [facility]   7/12/2021  5:30 PM Erin Espitia PTA [facility]   7/14/2021  5:15 PM Erin Espitia, PTA [facility]

## 2021-06-25 ENCOUNTER — APPOINTMENT (OUTPATIENT)
Dept: PHYSICAL THERAPY | Age: 31
End: 2021-06-25
Payer: MEDICAID

## 2021-07-07 ENCOUNTER — HOSPITAL ENCOUNTER (OUTPATIENT)
Dept: PHYSICAL THERAPY | Age: 31
Discharge: HOME | End: 2021-07-07
Payer: MEDICAID

## 2021-07-07 PROCEDURE — 97112 NEUROMUSCULAR REEDUCATION: CPT

## 2021-07-07 PROCEDURE — 97110 THERAPEUTIC EXERCISES: CPT

## 2021-07-07 PROCEDURE — 97530 THERAPEUTIC ACTIVITIES: CPT

## 2021-07-07 NOTE — PROGRESS NOTES
PT DAILY TREATMENT NOTE     Patient Name: Brittani Clements  Date:2021  : 1990  [x]  Patient  Verified  Payor: Austin Hong / Plan: [email or web address] / Product Type: Managed Care Medicaid /    In time: 15:30 Out time: 16:12  Total Treatment Time (min): 42   Visit #: 3 of     Medicare/BCBS Only   Total Timed Codes (min):  42 1:1 Treatment Time:  n/a       Treatment Area: Dorsalgia of lumbosacral region [M54.5]    SUBJECTIVE  Pain Level (0-10 scale): 10  Any medication changes, allergies to medications, adverse drug reactions, diagnosis change, or new procedure performed?: [x] No    [] Yes (see summary sheet for update)  Subjective functional status/changes:   [] No changes reported  Patient reports no changes since last PT session.      OBJECTIVE    10 min Therapeutic Exercise:  [x] See flow sheet :   Rationale: increase ROM and increase strength to improve the patient's ability to lift, carry, and bend    12 min Therapeutic Activity:  [x] See flow sheet :   Rationale: increase ROM and increase strength to improve the patient's ability to lift, carry, and bend    20 min Neuromuscular Re-education:  [x] See flow sheet :   Rationale: increase ROM and increase strength to improve the patient's ability to lift, carry, and bend          With   [x] TE   [x] TA   [x] neuro   [] other: Patient Education: [x] Review HEP    [] Progressed/Changed HEP based on:   [] positioning   [] body mechanics   [] transfers   [] heat/ice application    [x] other: updated HEP provided and reviewed     Other Objective/Functional Measures:   Pain at worst: 6/10 in last 5 days  % improvement: \"about the same\"  Pt reports pain \"a little bit better\" with lifting/carrying daughter  Added: TA with BKFO, TA with ball squeeze; initiated: clams in supine      Pain Level (0-10 scale) post treatment: 1/10    ASSESSMENT/Changes in Function:   Pt reports improved pain levels post PT session, especially after sciatic nerve glide on right LE. Skilled cues provided to perform added therex with proper body mechanics. Provided with and discussed HEP; verbalized understanding. Will plan to continue to progress patient as tolerated. Patient will continue to benefit from skilled PT services to modify and progress therapeutic interventions, address functional mobility deficits, address ROM deficits, address strength deficits, analyze and address soft tissue restrictions, analyze and cue movement patterns, analyze and modify body mechanics/ergonomics, assess and modify postural abnormalities, address imbalance/dizziness and instruct in home and community integration to attain remaining goals. [x]  See Plan of Care  []  See progress note/recertification  []  See Discharge Summary         Progress towards goals / Updated goals:  Short Term Goals: To be accomplished in 1 weeks:  1) Goal: Patient will be independent and compliant with HEP in order to progress toward long term goals. Status at last note/certification: issued and reviewed  Current: pt reports 1-2x weekly compliance. 6/23/21  Long Term Goals: To be accomplished in 8-12 treatments:  1) Goal: Patient will improve FOTO assessment score to 60 pts in order to indicate improved functional abilities. Status at last note/certification: 44 pts  Current:  2) Goal: Patient will improve KAUSHIK hip extension to 20 deg for improved posture and gait mechanics  Status at last note/certification:  L= -5 deg, R=0 deg  Current:  3) Goal: Patient will report worst back pain as 2/10 or less in order to progress toward personal goals. Status at last note/certification: 82/06  Current: 6/10 in last 5 days, progressing, not met (7/7/21)  4) Goal: Patient will report overall at least 65% improvement in function in order to progress toward premorbid status.   Status at last note/certification: n/a   Current: % improvement: \"about the same\"; not met (7/7/21)  5) Goal: Patient will demonstrate #25+ floor to chest lift with proper lifting technique to care for her daughter.   Status at last note/certification: Pain with lifting/carrying daughter    Current: Pt reports pain \"a little bit better\" with lifting/carrying daughter, progressing, not met (7/7/21)    PLAN  [x]  Upgrade activities as tolerated     [x]  Continue plan of care  []  Update interventions per flow sheet       []  Discharge due to:_  []  Other:_          Nakia Duffy PT, LPTA 7/7/2021  16:16 PM    Future Appointments   Date Time Provider Chris Banegas   7/9/2021  3:30 PM [facility] [id number]   7/12/2021  5:30 PM Val Coffey PTA [facility] [address]   7/14/2021  5:15 PM Val Coffey PTA [facility] [id number]

## 2021-07-09 ENCOUNTER — HOSPITAL ENCOUNTER (OUTPATIENT)
Dept: PHYSICAL THERAPY | Age: 31
Discharge: HOME | End: 2021-07-09
Payer: MEDICAID

## 2021-07-09 PROCEDURE — 97110 THERAPEUTIC EXERCISES: CPT

## 2021-07-09 PROCEDURE — 97112 NEUROMUSCULAR REEDUCATION: CPT

## 2021-07-09 PROCEDURE — 97530 THERAPEUTIC ACTIVITIES: CPT

## 2021-07-09 NOTE — PROGRESS NOTES
[id number] [facility] PHYSICAL THERAPY  Sadie Robles 38, [place], [address] - Phone: [phone]  Fax: [phone]  PROGRESS NOTE  Patient Name: Ganga Newton : 1990   Treatment/Medical Diagnosis: Dorsalgia of lumbosacral region [M54.5]   Referral Source: Rocio Juárez MD     Date of Initial Visit: 6/10/21 Attended Visits: 4 Missed Visits: 0     SUMMARY OF TREATMENT  Therapeutic exercise for lumbar/LE flexibility, postural awareness/strengthening, lumbopelvic/core stabilization, manual intervention, patient education and HEP. CURRENT STATUS  Pt reports 30% overall improvement in sx since beginning care. Pt reports 5/10 max pain made worse with prolonged or repetitive bending/squatting type ADL's. Pt is making slow but steady progress with gaining lumbar and LE mobility/flexibility as well as advancing with strengthening and lumbopelvic/core stabilization within current POC. Improvements: Pt presents with the most functional improvement with slight improvement with bending over for short periods of time with changing her daughter's diaper. Remaining functional limitations: Increased limitations/pain with prolonged or repetitive bending/squatting type ADL's. Objective Measurements:  Lumbar AROM= Flexion= 80%; Extension= full/WFL's with end range pain; Side bending= full/WFL's bilaterally   Hip Extension AROM= L= 15 deg, R= 15 deg ! FOTO 65/100    Goal/Measure of Progress Goal Met? 1. Patient will be independent and compliant with HEP in order to progress toward long term goals. Status at last Eval: issued and reviewed Current Status: Met, Pt reports independence and compliance with current HEP yes   2. Patient will improve FOTO assessment score to 60 pts in order to indicate improved functional abilities. Status at last Eval: 44/100 Current Status: 65/100 yes   3.   Patient will improve KAUSHIK hip extension to 20 deg for improved posture and gait mechanics   Status at last Eval: L= -5 deg, R=0 deg Current Status: L= 15 deg, R= 15 deg ! progress     Goal/Measure of Progress Goal Met? 4. Patient will report worst back pain as 2/10 or less in order to progress toward personal goals   Status at last Eval: 10/10 Current Status: 5/10 progress   5. Patient will report overall at least 65% improvement in function in order to progress toward premorbid status. Status at last Eval: n/a Current Status: 30% improvement reported  progress   6. Patient will demonstrate #25+ floor to chest lift with proper lifting technique to care for her daughter. Status at last Eval: Pain with lifting/carrying daughter Current Status: Met with ability to demonstrate for 5 reps with 25 lb crate today yes     New Goals to be achieved in __8__  treatments:  1. Patient will improve KAUSHIK hip extension to 20 deg for improved posture and gait mechanics  2. Patient will report worst back pain as 2/10 or less in order to progress toward personal goals  3. Patient will report overall at least 65% improvement in function in order to progress toward premorbid status  4. Pt will report =/> 75% reduction in pain/symptoms with performing prolonged or repetitive bending/squatting type ADL's for increased tolerance with ADL's. RECOMMENDATIONS  Continue with current POC for 8 remaining visits left on current script with advancing as tolerated, then reassess for the need for continuation or discharge from therapy. If you have any questions/comments please contact us directly at [phone]. Thank you for allowing us to assist in the care of your patient.     Therapist Signature: Radha Hui PTA Date: 7/9/2021    Lu Rodriguez DPT Time: 12:44 PM   NOTE TO PHYSICIAN:  PLEASE COMPLETE THE ORDERS BELOW AND FAX TO   [facility]: [phone]  If you are unable to process this request in 24 hours please contact our office: [phone]    ___ I have read the above report and request that my patient continue as recommended.   ___ I have read the above report and request that my patient continue therapy with the following changes/special instructions:_________________________________________________________   ___ I have read the above report and request that my patient be discharged from therapy.      Physician Signature:        Date:       Time:       Madeline Lassiter MD

## 2021-07-09 NOTE — PROGRESS NOTES
PHYSICAL THERAPY - DAILY TREATMENT NOTE    Patient Name: Sparkle Balderas        Date: 2021  : 1990   yes Patient  Verified  Visit #:   4     Insurance: Payor: Alex Mena / Plan: [id number] / Product Type: Managed Care Medicaid /      In time: 3:38 Out time: 4:25   Total Treatment Time: 47     Medicare/BCBS Time Tracking (below)   Total Timed Codes (min):  47 1:1 Treatment Time:  47     TREATMENT AREA =  Dorsalgia of lumbosacral region [M54.5]    SUBJECTIVE  Pain Level (on 0 to 10 scale):  0  / 10   Medication Changes/New allergies or changes in medical history, any new surgeries or procedures?    no  If yes, update Summary List   Subjective Functional Status/Changes:  []  No changes reported   My back is feeling pretty good today, I don't have any pain today, but I did have some pain yesterday when I was bent over the sink washing my daughter's hair for a while.           OBJECTIVE      16 min Therapeutic Exercise:     Rationale: increase ROM, increase strength, improve coordination, improve balance, and increase proprioception to improve the patients ability to progress to functional activities limited by pain or other dysfunction     15 min Therapeutic Activity:     Rationale: to improve functional activities, model real life movements and performance specific to the patients need with supervision to return the patient to their PLOF      16 min Neuromuscular Re-education:     Rationale: exercises designed to improve and/or maintain balance, coordination, kinesthetic sense, posture, and/or proprioception for functional activities to improve the patients ability to function in daily life          Billed With/As:   [] TE   [] TA   [] Neuro   [] Self Care Patient Education: [x] Review HEP    [] Progressed/Changed HEP based on:   [] positioning   [] body mechanics   [] transfers   [] heat/ice application    [] other:      Other Objective/Functional Measures:  See Progress note/Physician update for full detailed progress towards established goals. Post Treatment Pain Level (on 0 to 10) scale:   0  / 10     ASSESSMENT  Assessment/Changes in Function:   See Progress note/Physician update for full detailed progress towards established goals. [x]  See Progress Note/Recertification   Patient will continue to benefit from skilled PT services to modify and progress therapeutic interventions, address functional mobility deficits, address ROM deficits, address strength deficits, analyze and address soft tissue restrictions, analyze and cue movement patterns, analyze and modify body mechanics/ergonomics, assess and modify postural abnormalities and instruct in home and community integration to attain remaining goals. Progress toward goals / Updated goals:  See Progress note/Physician update for full detailed progress towards established goals.      PLAN  [x]  Upgrade activities as tolerated yes Continue plan of care   []  Discharge due to :    []  Other:      Therapist: Jackeline Byrd PTA    Date: 7/9/2021 Time: 12:42 PM     Future Appointments   Date Time Provider Chris Banegas   7/9/2021  3:30 PM Grabiel Ramirez [facility]   7/12/2021  5:30 PM Lenda Pellet PTA [facility]   7/14/2021  5:15 PM Lenda Pellet, PTA [facility]

## 2021-07-12 ENCOUNTER — HOSPITAL ENCOUNTER (OUTPATIENT)
Dept: PHYSICAL THERAPY | Age: 31
Discharge: HOME | End: 2021-07-12
Payer: MEDICAID

## 2021-07-12 PROCEDURE — 97110 THERAPEUTIC EXERCISES: CPT

## 2021-07-12 PROCEDURE — 97530 THERAPEUTIC ACTIVITIES: CPT

## 2021-07-12 PROCEDURE — 97112 NEUROMUSCULAR REEDUCATION: CPT

## 2021-07-12 NOTE — PROGRESS NOTES
PHYSICAL T5:30HERAPY - DAILY TREATMENT NOTE    Patient Name: Clent Halsted        Date: 2021  : 1990   yes Patient  Verified  Visit #:     Insurance: Payor: Barrington Phillips / Plan: Rashel Natarajan / Product Type: Managed Care Medicaid /      In time: 5:30 Out time: 6:00   Total Treatment Time: 30     Medicare/BCBS Time Tracking (below)   Total Timed Codes (min):  30 1:1 Treatment Time:  30     TREATMENT AREA =  Dorsalgia of lumbosacral region [M54.5]    SUBJECTIVE  Pain Level (on 0 to 10 scale):  0  / 10   Medication Changes/New allergies or changes in medical history, any new surgeries or procedures? yes  If yes, update Summary List   Subjective Functional Status/Changes:  []  No changes reported   I really didn't have much pain or problems with my back at all over the weekend because they are teaching her how to do things more on her own so it is less stress on my back.            OBJECTIVE    10 min Therapeutic Exercise:     Rationale: increase ROM, increase strength, improve coordination, improve balance, and increase proprioception to improve the patients ability to progress to functional activities limited by pain or other dysfunction     10 min Therapeutic Activity:     Rationale: to improve functional activities, model real life movements and performance specific to the patients need with supervision to return the patient to their PLOF      10 min Neuromuscular Re-education:     Rationale: exercises designed to improve and/or maintain balance, coordination, kinesthetic sense, posture, and/or proprioception for functional activities to improve the patients ability to function in daily life            Billed With/As:   [] TE   [] TA   [] Neuro   [] Self Care Patient Education: [x] Review HEP    [] Progressed/Changed HEP based on:   [] positioning   [] body mechanics   [] transfers   [] heat/ice application    [] other:      Other Objective/Functional Measures:     Post Treatment Pain Level (on 0 to 10) scale:   0  / 10     ASSESSMENT  Assessment/Changes in Function:   Pt presents with the most functional improvement with minimal to no reoccurrence of lumbar symptoms since last treatment due to her disabled daughter being more independent with her transfer. Pt may be able to discharge from therapy sooner if she remains on the same track over the next few treatments. Will continue to progress/advance patient within current POC as tolerated with monitoring symptoms. []  See Progress Note/Recertification   Patient will continue to benefit from skilled PT services to modify and progress therapeutic interventions, address functional mobility deficits, address ROM deficits, address strength deficits, analyze and cue movement patterns, analyze and modify body mechanics/ergonomics, assess and modify postural abnormalities and instruct in home and community integration to attain remaining goals. Progress toward goals / Updated goals:  New Goals to be achieved in __8__  treatments:  1. Patient will improve KAUSHIK hip extension to 20 deg for improved posture and gait mechanics  2. Patient will report worst back pain as 2/10 or less in order to progress toward personal goals  3. Patient will report overall at least 65% improvement in function in order to progress toward premorbid status  4. Pt will report =/> 75% reduction in pain/symptoms with performing prolonged or repetitive bending/squatting type ADL's for increased tolerance with ADL's.      PLAN  [x]  Upgrade activities as tolerated yes Continue plan of care   []  Discharge due to :    []  Other:      Therapist: Fercho Cueto PTA    Date: 7/12/2021 Time: 5:52 PM     Future Appointments   Date Time Provider Chris Banegas   7/14/2021  5:15 PM Kesha Benitez PTA [facility]

## 2021-07-14 ENCOUNTER — APPOINTMENT (OUTPATIENT)
Dept: PHYSICAL THERAPY | Age: 31
End: 2021-07-14
Payer: MEDICAID

## 2021-07-19 ENCOUNTER — HOSPITAL ENCOUNTER (OUTPATIENT)
Dept: PHYSICAL THERAPY | Age: 31
Discharge: HOME | End: 2021-07-19
Payer: MEDICAID

## 2021-07-19 PROCEDURE — 97530 THERAPEUTIC ACTIVITIES: CPT

## 2021-07-19 PROCEDURE — 97112 NEUROMUSCULAR REEDUCATION: CPT

## 2021-07-19 PROCEDURE — 97110 THERAPEUTIC EXERCISES: CPT

## 2021-07-19 NOTE — PROGRESS NOTES
PHYSICAL THERAPY - DAILY TREATMENT NOTE    Patient Name: Jerel Granado        Date: 2021  : 1990   yes Patient  Verified  Visit #:     Insurance: Payor: Jeremi Lovell / Plan: Dylan Cho / Product Type: Managed Care Medicaid /      In time: 4:15 Out time: 4:44   Total Treatment Time: 29     Medicare/BCBS Time Tracking (below)   Total Timed Codes (min):  na 1:1 Treatment Time:  na     TREATMENT AREA =  Dorsalgia of lumbosacral region [M54.5]    SUBJECTIVE  Pain Level (on 0 to 10 scale):  4  / 10   Medication Changes/New allergies or changes in medical history, any new surgeries or procedures?    no  If yes, update Summary List   Subjective Functional Status/Changes:  []  No changes reported     \"I vacuumed, cleaned dishes and prepared food today so I am feeling more sore than normal\"          OBJECTIVE    11 min Therapeutic Exercise:     Rationale: increase ROM, increase strength, improve coordination, improve balance, and increase proprioception to improve the patients ability to progress to functional activities limited by pain or other dysfunction     8 min Therapeutic Activity:     Rationale: to improve functional activities, model real life movements and performance specific to the patients need with supervision to return the patient to their PLOF      10 min Neuromuscular Re-education:     Rationale: exercises designed to improve and/or maintain balance, coordination, kinesthetic sense, posture, and/or proprioception for functional activities to improve the patients ability to function in daily life          Billed With/As:   [x] TE   [x] TA   [x] Neuro   [] Self Care Patient Education: [x] Review HEP    [] Progressed/Changed HEP based on:   [] positioning   [] body mechanics   [] transfers   [] heat/ice application    [] other:      Other Objective/Functional Measures:    Inc reps w/ HL hip adduction  Able to tolerate SL for clamshells this visit  Cues to avoid trunk rotation w/ supine piriformis stretch     Post Treatment Pain Level (on 0 to 10) scale:   0  / 10     ASSESSMENT  Assessment/Changes in Function:     Despite increased pain level upon arrival to PT, pt was able to tolerate progression of reps and positioning. Pt able to lay SL (bilaterally), which she has previously been unable to do 2' LBP. Reports relief of pain w/ supine HS str     []  See Progress Note/Recertification   Patient will continue to benefit from skilled PT services to modify and progress therapeutic interventions, address functional mobility deficits, address ROM deficits, address strength deficits, analyze and address soft tissue restrictions, analyze and cue movement patterns, analyze and modify body mechanics/ergonomics, assess and modify postural abnormalities and instruct in home and community integration to attain remaining goals. Progress toward goals / Updated goals:    1. Patient will improve KAUSHIK hip extension to 20 deg for improved posture and gait mechanics  2. Patient will report worst back pain as 2/10 or less in order to progress toward personal goals 7/19/21: goal not met, c/o 4/10 pain level upon arrival to PT today. 3. Patient will report overall at least 65% improvement in function in order to progress toward premorbid status   4. Pt will report =/> 75% reduction in pain/symptoms with performing prolonged or repetitive bending/squatting type ADL's for increased tolerance with ADL's.        PLAN  []  Upgrade activities as tolerated yes Continue plan of care   []  Discharge due to :    []  Other:      Therapist: Evelyn Chatman PT    Date: 7/19/2021 Time: 9:51 AM     Future Appointments   Date Time Provider Chris Banegas   7/19/2021  4:15 PM Jt Fernandez PT [facility]   7/23/2021  1:15 PM Farheen Moulding [facility]   7/26/2021  4:15 PM Jt Fernandez PT [facility]   7/29/2021  4:15 PM Aren Lane [facility]

## 2021-07-21 ENCOUNTER — HOSPITAL ENCOUNTER (OUTPATIENT)
Dept: PHYSICAL THERAPY | Age: 31
Discharge: HOME | End: 2021-07-21
Payer: MEDICAID

## 2021-07-21 PROCEDURE — 97112 NEUROMUSCULAR REEDUCATION: CPT

## 2021-07-21 PROCEDURE — 97530 THERAPEUTIC ACTIVITIES: CPT

## 2021-07-21 PROCEDURE — 97110 THERAPEUTIC EXERCISES: CPT

## 2021-07-21 NOTE — PROGRESS NOTES
PHYSICAL THERAPY - DAILY TREATMENT NOTE    Patient Name: Vicente Centers        Date: 2021  : 1990   yes Patient  Verified  Visit #:     Insurance: Payor: Khushinoble Sultana / Plan: Joye Nyhan / Product Type: Managed Care Medicaid /      In time: 12:12 Out time: 12:48   Total Treatment Time: 36     Medicare/BCBS Time Tracking (below)   Total Timed Codes (min):  na 1:1 Treatment Time:  na     TREATMENT AREA =  Dorsalgia of lumbosacral region [M54.5]    SUBJECTIVE  Pain Level (on 0 to 10 scale):  3  / 10   Medication Changes/New allergies or changes in medical history, any new surgeries or procedures?    no  If yes, update Summary List   Subjective Functional Status/Changes:  []  No changes reported     Pt states her back is definitely improving with PT. She continues to notice back pain with flexion activities, I.e. washing her daughter's hair. OBJECTIVE    10 min Therapeutic Exercise:     Rationale: increase ROM, increase strength, improve coordination, improve balance, and increase proprioception to improve the patients ability to progress to functional activities limited by pain or other dysfunction     10 min Therapeutic Activity:     Rationale: to improve functional activities, model real life movements and performance specific to the patients need with supervision to return the patient to their PLOF      11 min Neuromuscular Re-education:     Rationale: exercises designed to improve and/or maintain balance, coordination, kinesthetic sense, posture, and/or proprioception for functional activities to improve the patients ability to function in daily life    5 min Manual Therapy:  METs for pubic clearing and R ant ROT innominate   The manual therapy interventions were performed at a separate and distinct time from the therapeutic activities interventions.   Rationale: decrease pain and increase ROM to tolerate transfers and ambulation            Billed With/As:   [x] TE   [x] TA   [x] Neuro   [] Self Care Patient Education: [x] Review HEP    [] Progressed/Changed HEP based on:   [] positioning   [] body mechanics   [] transfers   [] heat/ice application    [x] other: pt ed on body mechanics for washing dtr's hair. Other Objective/Functional Measures:    -frequent cues to maintain slight knee flexion with standing hip hinges  -performed standing squats with limited depth and significant hip abd/ER (no band for reinforcement); attempted to perform SB squats for improved depth and strength however pt declines 2/2 c/o fatigue from cardio workout yesterday which made her legs sore; \"I work with a  and he makes us do a lot of squats\"       Post Treatment Pain Level (on 0 to 10) scale:   0  / 10     ASSESSMENT  Assessment/Changes in Function:     Pt with significant deconditioning to core/LE's limiting tolerance for reps/resistance. Pt encouraged on compliance with HEP and to be careful monitoring form with her cardio workouts she does with her . Pt reported reduced R hip pain after SI METs. Plan to continue advancing with LE/core strength. []  See Progress Note/Recertification   Patient will continue to benefit from skilled PT services to modify and progress therapeutic interventions, address functional mobility deficits, address ROM deficits, address strength deficits, analyze and address soft tissue restrictions, analyze and cue movement patterns, analyze and modify body mechanics/ergonomics, assess and modify postural abnormalities and instruct in home and community integration to attain remaining goals. Progress toward goals / Updated goals:    1. Patient will improve KAUSHIK hip extension to 20 deg for improved posture and gait mechanics  2. Patient will report worst back pain as 2/10 or less in order to progress toward personal goals 7/19/21: goal not met, c/o 4/10 pain level upon arrival to PT today.   3. Patient will report overall at least 65% improvement in function in order to progress toward premorbid status   4. Pt will report =/> 75% reduction in pain/symptoms with performing prolonged or repetitive bending/squatting type ADL's for increased tolerance with ADL's. PLAN  []  Upgrade activities as tolerated yes Continue plan of care   []  Discharge due to :    []  Other:      Therapist: Farehen Hines.  Karen Mcclendon PT    Date: 7/21/2021 Time: 12:53 PM      Future Appointments   Date Time Provider Chris Banegas   7/21/2021 12:15 PM Cesar Davidson, PT [facility] [address]   7/26/2021  4:15 PM Dot Flores, PT [facility] [address]   7/29/2021  4:15 PM Ambrosio Osman [address]

## 2021-07-23 ENCOUNTER — APPOINTMENT (OUTPATIENT)
Dept: PHYSICAL THERAPY | Age: 31
End: 2021-07-23
Payer: MEDICAID

## 2021-07-26 ENCOUNTER — HOSPITAL ENCOUNTER (OUTPATIENT)
Dept: PHYSICAL THERAPY | Age: 31
Discharge: HOME | End: 2021-07-26
Payer: MEDICAID

## 2021-07-26 PROCEDURE — 97112 NEUROMUSCULAR REEDUCATION: CPT

## 2021-07-26 PROCEDURE — 97530 THERAPEUTIC ACTIVITIES: CPT

## 2021-07-26 PROCEDURE — 97110 THERAPEUTIC EXERCISES: CPT

## 2021-07-26 NOTE — PROGRESS NOTES
PHYSICAL THERAPY - DAILY TREATMENT NOTE    Patient Name: Rebecca Marcus        Date: 2021  : 1990   yes Patient  Verified  Visit #:   8      12  Insurance: Payor: Georgia Stanley / Plan: Hellen Hazel / Product Type: Managed Care Medicaid /      In time: 4:16 Out time: 4:46   Total Treatment Time: 30     Medicare/BCBS Time Tracking (below)   Total Timed Codes (min):  na 1:1 Treatment Time:  na     TREATMENT AREA =  Dorsalgia of lumbosacral region [M54.5]    SUBJECTIVE  Pain Level (on 0 to 10 scale):  0  / 10   Medication Changes/New allergies or changes in medical history, any new surgeries or procedures?    no  If yes, update Summary List   Subjective Functional Status/Changes:  []  No changes reported     \"I had some pain while washing dishes today after about 10 minutes. \" Pt reports pain got up to 4/10 but states she laid on her back with her legs hanging off and pain subsided. Denies any other pain since her last session.            OBJECTIVE  12 min Therapeutic Exercise:     Rationale: increase ROM, increase strength, improve coordination, improve balance, and increase proprioception to improve the patients ability to progress to functional activities limited by pain or other dysfunction     8 min Therapeutic Activity:     Rationale: to improve functional activities, model real life movements and performance specific to the patients need with supervision to return the patient to their PLOF      10 min Neuromuscular Re-education:     Rationale: exercises designed to improve and/or maintain balance, coordination, kinesthetic sense, posture, and/or proprioception for functional activities to improve the patients ability to function in daily life          Billed With/As:   [x] TE   [x] TA   [x] Neuro   [] Self Care Patient Education: [x] Review HEP    [] Progressed/Changed HEP based on:   [] positioning   [] body mechanics   [] transfers   [] heat/ice application    [] other: Other Objective/Functional Measures:    10 continuous reps of supine bridge to 90% AROM  Added prone hip ext to improve glute strength; challenged L>R 2' weakness. Post Treatment Pain Level (on 0 to 10) scale:   0  / 10     ASSESSMENT  Assessment/Changes in Function:     Pt demo improving strength/function, evident by ability to inc reps/reduce rest and decreased pain w/ activity. Pt c/o pain central l/s (2/10 on VAS) w/ prone hip ext L>R indicating continued core weakness. []  See Progress Note/Recertification   Patient will continue to benefit from skilled PT services to modify and progress therapeutic interventions, address functional mobility deficits, address ROM deficits, address strength deficits, analyze and address soft tissue restrictions, analyze and cue movement patterns, analyze and modify body mechanics/ergonomics, assess and modify postural abnormalities and instruct in home and community integration to attain remaining goals. Progress toward goals / Updated goals:    1. Patient will improve KASUHIK hip extension to 20 deg for improved posture and gait mechanics  2. Patient will report worst back pain as 2/10 or less in order to progress toward personal goals 7/19/21: goal not met, c/o 4/10 pain level upon arrival to PT today. 3. Patient will report overall at least 65% improvement in function in order to progress toward premorbid status   4. Pt will report =/> 75% reduction in pain/symptoms with performing prolonged or repetitive bending/squatting type ADL's for increased tolerance with ADL's.        PLAN  []  Upgrade activities as tolerated yes Continue plan of care   []  Discharge due to :    []  Other:      Therapist: Eliana Melo PT    Date: 7/26/2021 Time: 10:40 AM     Future Appointments   Date Time Provider Chris Banegas   7/26/2021  4:15 PM Jacinto Lovell PT [facility]   7/29/2021  4:15 PM Ivná Lopez [facility]

## 2021-07-29 ENCOUNTER — HOSPITAL ENCOUNTER (OUTPATIENT)
Dept: PHYSICAL THERAPY | Age: 31
Discharge: HOME | End: 2021-07-29
Payer: MEDICAID

## 2021-07-29 PROCEDURE — 97530 THERAPEUTIC ACTIVITIES: CPT

## 2021-07-29 PROCEDURE — 97112 NEUROMUSCULAR REEDUCATION: CPT

## 2021-07-29 PROCEDURE — 97110 THERAPEUTIC EXERCISES: CPT

## 2021-07-29 NOTE — PROGRESS NOTES
PHYSICAL THERAPY - DAILY TREATMENT NOTE    Patient Name: Tripp Otoole        Date: 2021  : 1990   yes Patient  Verified  Visit #:     Insurance: Payor: Celestina Kendall / Plan: [employer] / Product Type: Managed Care Medicaid /      In time: 174 Out time: 400D   Total Treatment Time: 33     Medicare/BCBS Time Tracking (below)   Total Timed Codes (min):  na 1:1 Treatment Time:  na     TREATMENT AREA =  Dorsalgia of lumbosacral region [M54.5]    SUBJECTIVE  Pain Level (on 0 to 10 scale):  0  / 10   Medication Changes/New allergies or changes in medical history, any new surgeries or procedures?    no  If yes, update Summary List   Subjective Functional Status/Changes:  []  No changes reported   Pt reports her back is not painful today        OBJECTIVE  14 min Therapeutic Exercise:     Rationale: increase ROM, increase strength, improve coordination, improve balance, and increase proprioception to improve the patients ability to progress to functional activities limited by pain or other dysfunction     8 min Therapeutic Activity:     Rationale: to improve functional activities, model real life movements and performance specific to the patients need with supervision to return the patient to their PLOF      11 min Neuromuscular Re-education:     Rationale: exercises designed to improve and/or maintain balance, coordination, kinesthetic sense, posture, and/or proprioception for functional activities to improve the patients ability to function in daily life          Billed With/As:   [x] TE   [x] TA   [x] Neuro   [] Self Care Patient Education: [x] Review HEP    [] Progressed/Changed HEP based on:   [] positioning   [] body mechanics   [] transfers   [] heat/ice application    [] other:      Other Objective/Functional Measures:  Progressed dead bug to level 2  Continued with prone hip extensions  Added R IT band stretch     Post Treatment Pain Level (on 0 to 10) scale:   0 / 10 ASSESSMENT  Assessment/Changes in Function:   Pt was able to tolerate progression with dead bugs today evidencing improving core strength and dynamic stability. Pt continues to be challenged with prone hip ext, however will likely improve as strength increases. R IT band stretch with strap was added to address pt report of tightness of in R lateral hip. Pt reported dec in hip tightness with stretch. []  See Progress Note/Recertification   Patient will continue to benefit from skilled PT services to modify and progress therapeutic interventions, address functional mobility deficits, address ROM deficits, address strength deficits, analyze and address soft tissue restrictions, analyze and cue movement patterns, analyze and modify body mechanics/ergonomics, assess and modify postural abnormalities and instruct in home and community integration to attain remaining goals. Progress toward goals / Updated goals:    1. Patient will improve KAUSHIK hip extension to 20 deg for improved posture and gait mechanics addressing with hip flexor stretch 7/29/21  2. Patient will report worst back pain as 2/10 or less in order to progress toward personal goals 7/19/21: goal not met, c/o 4/10 pain level upon arrival to PT today. 3. Patient will report overall at least 65% improvement in function in order to progress toward premorbid status   4. Pt will report =/> 75% reduction in pain/symptoms with performing prolonged or repetitive bending/squatting type ADL's for increased tolerance with ADL's.        PLAN  []  Upgrade activities as tolerated yes Continue plan of care   []  Discharge due to :    []  Other:      Therapist: Real Dennis    Date: 7/29/2021 Time: 10:40 AM     Future Appointments   Date Time Provider Chris Banegas   7/29/2021  4:15 PM Bonilla Frank [facility]   8/3/2021  3:30 PM Jamar Isbell [facility]   8/5/2021  4:45 PM Ailin Aden, PT [facility]   8/9/2021  4:15 PM Elva Story PT [facility]

## 2021-08-03 ENCOUNTER — HOSPITAL ENCOUNTER (OUTPATIENT)
Dept: PHYSICAL THERAPY | Age: 31
Discharge: HOME | End: 2021-08-03
Payer: MEDICAID

## 2021-08-03 PROCEDURE — 97530 THERAPEUTIC ACTIVITIES: CPT

## 2021-08-03 PROCEDURE — 97110 THERAPEUTIC EXERCISES: CPT

## 2021-08-03 PROCEDURE — 97112 NEUROMUSCULAR REEDUCATION: CPT

## 2021-08-03 NOTE — PROGRESS NOTES
PHYSICAL THERAPY - DAILY TREATMENT NOTE    Patient Name: Patricia Hanley        Date: 8/3/2021  : 1990   yes Patient  Verified  Visit #:   10   of   12  Insurance: Payor: Lexi Pen / Plan: e(ye)BRAIN / Product Type: Managed Care Medicaid /      In time: 3:29 Out time: 3:58   Total Treatment Time: 29     Medicare/BCBS Time Tracking (below)   Total Timed Codes (min):  na 1:1 Treatment Time:  na     TREATMENT AREA =  Dorsalgia of lumbosacral region [M54.5]    SUBJECTIVE  Pain Level (on 0 to 10 scale):  0  / 10   Medication Changes/New allergies or changes in medical history, any new surgeries or procedures?    no  If yes, update Summary List   Subjective Functional Status/Changes:  []  No changes reported   I still get pain mostly when I bend forward over the sink.        OBJECTIVE    10 min Therapeutic Exercise:     Rationale: increase ROM, increase strength, improve coordination, improve balance, and increase proprioception to improve the patients ability to progress to functional activities limited by pain or other dysfunction     8 min Therapeutic Activity:     Rationale: to improve functional activities, model real life movements and performance specific to the patients need with supervision to return the patient to their PLOF      11 min Neuromuscular Re-education:     Rationale: exercises designed to improve and/or maintain balance, coordination, kinesthetic sense, posture, and/or proprioception for functional activities to improve the patients ability to function in daily life          Billed With/As:   [x] TE   [x] TA   [x] Neuro   [] Self Care Patient Education: [x] Review HEP    [] Progressed/Changed HEP based on:   [] positioning   [] body mechanics   [] transfers   [] heat/ice application    [] other:      Other Objective/Functional Measures:   - no cuing for form  - no pain with program  - min to mod challenge, progression of reps/resistance  NV     Post Treatment Pain Level (on 0 to 10) scale:   0 / 10     ASSESSMENT  Assessment/Changes in Function:   Patient is making good progress towards her goals and is able to complete full program in 30 minutes. Min to Mod challenge today with no pain and no cuing required. Will progress reps/resistance as able while monitoring sx. Patient continues to report pain with forward flexion as noted in subjective, may benefit from education on possible modifications to workplace/home environment to address this. []  See Progress Note/Recertification   Patient will continue to benefit from skilled PT services to modify and progress therapeutic interventions, address functional mobility deficits, address ROM deficits, address strength deficits, analyze and address soft tissue restrictions, analyze and cue movement patterns, analyze and modify body mechanics/ergonomics, assess and modify postural abnormalities and instruct in home and community integration to attain remaining goals. Progress toward goals / Updated goals:    1. Patient will improve KAUSHIK hip extension to 20 deg for improved posture and gait mechanics addressing with hip flexor stretch 7/29/21  2. Patient will report worst back pain as 2/10 or less in order to progress toward personal goals 7/19/21: goal not met, c/o 4/10 pain level upon arrival to PT today. 3. Patient will report overall at least 65% improvement in function in order to progress toward premorbid status. Current: 50-60% reported on 8/3/21   4. Pt will report =/> 75% reduction in pain/symptoms with performing prolonged or repetitive bending/squatting type ADL's for increased tolerance with ADL's.        PLAN  []  Upgrade activities as tolerated yes Continue plan of care   []  Discharge due to :    []  Other:      Therapist: MAMIE Charles    Date: 8/3/2021 Time: 3:58 PM     Future Appointments   Date Time Provider Chris Banegas   8/3/2021  3:30 PM Jonny Fischer [facility]   8/5/2021  4:45 PM Savannah Louis, PT [facility]   8/9/2021  4:15 PM Prospre Taylor, PT [facility]

## 2021-08-05 ENCOUNTER — HOSPITAL ENCOUNTER (OUTPATIENT)
Dept: PHYSICAL THERAPY | Age: 31
Discharge: HOME | End: 2021-08-05
Payer: MEDICAID

## 2021-08-05 PROCEDURE — 97530 THERAPEUTIC ACTIVITIES: CPT

## 2021-08-05 PROCEDURE — 97112 NEUROMUSCULAR REEDUCATION: CPT

## 2021-08-05 PROCEDURE — 97110 THERAPEUTIC EXERCISES: CPT

## 2021-08-05 NOTE — PROGRESS NOTES
PHYSICAL THERAPY - DAILY TREATMENT NOTE    Patient Name: Tripp Otoole        Date: 2021  : 1990   yes Patient  Verified  Visit #:     Insurance: Payor: Celestina Kendall / Plan: Josie Veliz / Product Type: Managed Care Medicaid /      In time: 4:52 Out time: 5:28   Total Treatment Time: 36     Medicare/BCBS Time Tracking (below)   Total Timed Codes (min):  na 1:1 Treatment Time:  na     TREATMENT AREA =  Dorsalgia of lumbosacral region [M54.5]    SUBJECTIVE  Pain Level (on 0 to 10 scale):  1  / 10   Medication Changes/New allergies or changes in medical history, any new surgeries or procedures?    no  If yes, update Summary List   Subjective Functional Status/Changes:  []  No changes reported   Pt states her back only really hurts when she's bent over doing dishes in the sink now       OBJECTIVE    13 min Therapeutic Exercise:     Rationale: increase ROM, increase strength, improve coordination, improve balance, and increase proprioception to improve the patients ability to progress to functional activities limited by pain or other dysfunction     10 min Therapeutic Activity:     Rationale: to improve functional activities, model real life movements and performance specific to the patients need with supervision to return the patient to their PLOF      13 min Neuromuscular Re-education:     Rationale: exercises designed to improve and/or maintain balance, coordination, kinesthetic sense, posture, and/or proprioception for functional activities to improve the patients ability to function in daily life          Billed With/As:   [x] TE   [x] TA   [x] Neuro   [] Self Care Patient Education: [x] Review HEP    [] Progressed/Changed HEP based on:   [] positioning   [] body mechanics   [] transfers   [] heat/ice application    [] other:      Other Objective/Functional Measures:   -advanced to goblet squat with 5 lb DB today; (limited squat depth ~40 deg)  -cues for increased TA with Pallof  -cues to increase core stability with lateral band walk (avoid trunk SB'ing)  -added f/b band walks, RDL      Post Treatment Pain Level (on 0 to 10) scale:   0 / 10     ASSESSMENT  Assessment/Changes in Function:   Pt requires moderate cues to maintain neutral l/s and knee flexion for RDL which was added today to advance towards functional lifting tolerance. Overall, pt continues to have decreased body mechanics requiring supervision and cues. Pt advised on plan for formal re-assessment NV with possibility for d/c if she has reached her personal goals from PT.     []  See Progress Note/Recertification   Patient will continue to benefit from skilled PT services to modify and progress therapeutic interventions, address functional mobility deficits, address ROM deficits, address strength deficits, analyze and address soft tissue restrictions, analyze and cue movement patterns, analyze and modify body mechanics/ergonomics, assess and modify postural abnormalities and instruct in home and community integration to attain remaining goals. Progress toward goals / Updated goals:    1. Patient will improve KAUSHIK hip extension to 20 deg for improved posture and gait mechanics addressing with hip flexor stretch 7/29/21  2. Patient will report worst back pain as 2/10 or less in order to progress toward personal goals 7/19/21: goal not met, c/o 4/10 pain level upon arrival to PT today.  -8/5: goal not met; max pain 4/10 with PT exercises today  3. Patient will report overall at least 65% improvement in function in order to progress toward premorbid status. Current: 50-60% reported on 8/3/21   4. Pt will report =/> 75% reduction in pain/symptoms with performing prolonged or repetitive bending/squatting type ADL's for increased tolerance with ADL's. PLAN  []  Upgrade activities as tolerated yes Continue plan of care   []  Discharge due to :    []  Other:      Therapist: Yanira Hernandez, PT    Date: 8/5/2021 Time: 5:28 PM      Future Appointments   Date Time Provider Chris Banegas   8/5/2021  4:45 PM Bee Frazier, PT [facility]   8/9/2021  4:15 PM Humera Brar, PT [facility]

## 2021-08-09 ENCOUNTER — HOSPITAL ENCOUNTER (OUTPATIENT)
Dept: PHYSICAL THERAPY | Age: 31
Discharge: HOME | End: 2021-08-09
Payer: MEDICAID

## 2021-08-09 PROCEDURE — 97530 THERAPEUTIC ACTIVITIES: CPT

## 2021-08-09 PROCEDURE — 97110 THERAPEUTIC EXERCISES: CPT

## 2021-08-09 PROCEDURE — 97112 NEUROMUSCULAR REEDUCATION: CPT

## 2021-08-09 NOTE — PROGRESS NOTES
[facility] PHYSICAL THERAPY  [address], [place], [address] - Phone: [phone]  Fax: [phone]  DISCHARGE SUMMARY  Patient Name: Marilin Shields : 1990   Treatment/Medical Diagnosis: Dorsalgia of lumbosacral region [M54.5]   Referral Source: Autumn Lau MD     Date of Initial Visit: 6/10/21 Attended Visits: 12 Missed Visits: 1     SUMMARY OF TREATMENT  Pt has attended 12 sessions of PT for the tx of lumbar pain. PT tx has consisted of therex, theract, and NMRE in order to improve axial stability, LE strength/flexibility, lifting mechanics, and dec pain. CURRENT STATUS  Pt reports 50% overall improvement in sx since start of care. Notes max pain 4/10, avg pain 0/10. Improvements: household chores, lifting daughter, squatting  Remaining Functional limitations: leaning over sink > 10 minutes (ie dishes, washing daughter's hair)    Objective:   AROM: lumbar flex 90%, ext WNL, R SB WNL, L SB WNL, RR WNL, LR WNL; all planes pain-free  MMT: hip flex L 4/5, R 4/5, abd L 4-/5, R 4/5, ext L 4-/5, R 4-/5, knee flex L 4-/5, R 3+/5, ext L 4/5, R 4-/5  SLS L 10\" (trendelenburg), R 7\" (trendelenburg)  Hip ext AROM L 14, R 18    FOTO 88/100    Goal/Measure of Progress Goal Met? 1. Improve B hip ext to 20 deg for improved posture and gait mechanics   Status at last Eval: L 15 deg, R 15 deg p! Current Status: L 14, R 18 no, progress   2. Pt will report worst back pain as 2/10 or less in order progress toward personal goals   Status at last Eval: 510 Current Status: 5/10 no   3. Pt will report overall at least 65% improvement in function in order to progress toward personal goals   Status at last Eval: 30% improvement Current Status: 50% improvement no, progress     4.   Pt will report >/= 75% reduction in pain/symptoms with performing prolonged or repetitive bending/squatting type ADLs for increased tolerance with ADLs   Status at last Eval: Pain made worse with prolonged/repetitive bending/squatting, 5/10 max pain Current Status: Continues to c/o pain with leaning over > 10 minutes up to 5/10; no pain w/ squatting Partially met     RECOMMENDATIONS  Discontinue therapy. Progressing towards or have reached established goals. Pt     If you have any questions/comments please contact us directly at [phone]. Thank you for allowing us to assist in the care of your patient. Therapist Signature: Juancarlos Fernandez PT Date: 8/9/2021     Time: 11:06 AM     NOTE TO PHYSICIAN:  Your patient's insurance requires this discharge note be signed and returned. PLEASE COMPLETE THE ORDERS BELOW AND RETURN TO:  [facility] @ [phone]    ___ I have read the above report and request that my patient be discharged from therapy.      Physician Signature:        Date:       Time:                       Spencer Workman MD

## 2021-08-09 NOTE — PROGRESS NOTES
PHYSICAL THERAPY - DAILY TREATMENT NOTE    Patient Name: Marylu Ramos        Date: 2021  : 1990   yes Patient  Verified  Visit #:     Insurance: Payor: Ayse Nelli / Plan: Vannessa Holden / Product Type: Managed Care Medicaid /      In time: 4:22 Out time: 5:04   Total Treatment Time: 42     Medicare/BCBS Time Tracking (below)   Total Timed Codes (min):  na 1:1 Treatment Time:  na     TREATMENT AREA =  Dorsalgia of lumbosacral region [M54.5]    SUBJECTIVE  Pain Level (on 0 to 10 scale):  0  / 10   Medication Changes/New allergies or changes in medical history, any new surgeries or procedures?    no  If yes, update Summary List   Subjective Functional Status/Changes:  []  No changes reported     See DC summary          OBJECTIVE    12 min Therapeutic Exercise:     Rationale: increase ROM, increase strength, improve coordination, improve balance, and increase proprioception to improve the patients ability to progress to functional activities limited by pain or other dysfunction     18 min Therapeutic Activity:  Per flow sheet and FOTO assessment   Rationale: to improve functional activities, model real life movements and performance specific to the patients need with supervision to return the patient to their PLOF      12 min Neuromuscular Re-education:     Rationale: exercises designed to improve and/or maintain balance, coordination, kinesthetic sense, posture, and/or proprioception for functional activities to improve the patients ability to function in daily life       Billed With/As:   [x] TE   [x] TA   [x] Neuro   [] Self Care Patient Education: [x] Review HEP    [] Progressed/Changed HEP based on:   [] positioning   [] body mechanics   [] transfers   [] heat/ice application    [] other:      Other Objective/Functional Measures:    See DC summary     Post Treatment Pain Level (on 0 to 10) scale:   0  / 10     ASSESSMENT  Assessment/Changes in Function:     See DC summary     []  See Progress Note/Recertification   Patient will continue to benefit from skilled PT services to n/a to attain remaining goals.    Progress toward goals / Updated goals:    See DC summary     PLAN  []  Upgrade activities as tolerated no Continue plan of care   [x]  Discharge due to : Progressing towards goals, program complete   []  Other:      Therapist: Dennis Pierre, PT    Date: 8/9/2021 Time: 11:02 AM     Future Appointments   Date Time Provider Chris Banegas   8/9/2021  4:15 PM Topher Arredondo, PT [facility]

## 2021-11-08 ENCOUNTER — OFFICE VISIT (OUTPATIENT)
Dept: VASCULAR SURGERY | Age: 31
End: 2021-11-08
Payer: MEDICAID

## 2021-11-08 VITALS
BODY MASS INDEX: 51.91 KG/M2 | OXYGEN SATURATION: 99 % | WEIGHT: 293 LBS | SYSTOLIC BLOOD PRESSURE: 136 MMHG | HEART RATE: 86 BPM | DIASTOLIC BLOOD PRESSURE: 72 MMHG | HEIGHT: 63 IN

## 2021-11-08 DIAGNOSIS — I87.1 SUBCLAVIAN VEIN STENOSIS, RIGHT: Primary | ICD-10-CM

## 2021-11-08 PROCEDURE — 99202 OFFICE O/P NEW SF 15 MIN: CPT | Performed by: SURGERY

## 2021-11-08 RX ORDER — CYCLOBENZAPRINE HCL 10 MG
TABLET ORAL
COMMUNITY

## 2021-11-08 NOTE — PROGRESS NOTES
Yanique Guthrie  Chief Complaint   Patient presents with    New Patient    Carotid Artery Stenosis         Impression and Plan:  33 y.o. 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 This is currently asymptomatic. We did discuss the fact that this may present a risk for possible future thrombosis. We also discussed the option of proceeding with venography and possible venoplasty based on findings. I also explained that watchful waiting could be a reasonable option given the significant collateral formation. At this time, she is not interested in pursuing intervention. We did discuss symptoms of thrombosis, and I explained that she should seek immediate attention if these develop. She may otherwise follow up PRN. History and Physical    32y/o female with CT evidence of right subclavian stenosis near the junction with the SVC. This was apparently noted on a prior scan, and again seen on follow up imaging in Sept at which time, it was noted to be more severe. There are comments regarding significant collateral formation. She does not have any right upper extremity complaints at this time. Denies swelling or other issues.       Past Medical History:   Diagnosis Date    Abdominal pain     Asthma     Diabetes ([address]     H/O tracheostomy 1990    croup    History of tonsillectomy and adenoidectomy     Hypercholesterolemia     Hypertension     IBS (irritable bowel syndrome)     Migraine     Morbid obesity (HCC)     Sleep apnea     use cpap     Patient Active Problem List   Diagnosis Code    Bowel obstruction ([address] K56.609    CAP (community acquired pneumonia) J18.9    Suspected COVID-19 virus infection Z20.822    RLL pneumonia J18.9     Past Surgical History:   Procedure Laterality Date    COLONOSCOPY  7/30/2019         COLONOSCOPY N/A 7/30/2019    COLONOSCOPY performed by Kelby Duncan MD at [address] [place] [address] HX CHOLECYSTECTOMY      HX HEENT      T&A    MO REMOVAL GALLBLADDER       Current Outpatient Medications   Medication Sig Dispense Refill    cyclobenzaprine (FLEXERIL) 10 mg tablet cyclobenzaprine 10 mg tablet   1 po qday      ondansetron (Zofran ODT) 4 mg disintegrating tablet Take 1 Tab by mouth every eight (8) hours as needed for Nausea. 12 Tab 0    butalbital-acetaminophen-caff (Fioricet) -40 mg per capsule Take 1 Cap by mouth every six (6) hours as needed for Headache. 16 Cap 0    albuterol (ProAir HFA) 90 mcg/actuation inhaler Take 2 Puffs by inhalation every four (4) hours as needed for Wheezing or Shortness of Breath. 1 Inhaler 1    insulin glargine (LANTUS,BASAGLAR) 100 unit/mL (3 mL) inpn 60 Units by SubCUTAneous route daily.  insulin aspart U-100 (NOVOLOG FLEXPEN U-100 INSULIN) 100 unit/mL inpn 15 Units by SubCUTAneous route Before breakfast, lunch, and dinner. (Patient taking differently: 30 Units by SubCUTAneous route Before breakfast, lunch, and dinner.) 1 Units 0    pioglitazone (ACTOS) 30 mg tablet Take 30 mg by mouth daily.  cholecalciferol (VITAMIN D3) 50,000 unit capsule Take 50,000 Units by mouth every Monday, Wednesday, Friday.  fexofenadine (ALLEGRA) 180 mg tablet Take 180 mg by mouth daily.  lisinopril (PRINIVIL, ZESTRIL) 20 mg tablet Take 20 mg by mouth daily.  dexAMETHasone (DECADRON) 4 mg tablet Take 1 Po daily until empty (Patient not taking: Reported on 11/8/2021) 6 Tab 0    rivaroxaban (Xarelto) 15 mg (42)- 20 mg (9) DsPk Take one 15 mg tablet twice a day with food for the first 21 days. Then, take one 20 mg tablet once a day with food for 9 days. (Patient not taking: Reported on 11/8/2021) 1 Dose Pack 0    rivaroxaban (Xarelto) 20 mg tab tablet Take 1 Tab by mouth daily. (Patient not taking: Reported on 11/8/2021) 30 Tab 0    dulaglutide (TRULICITY SC) by SubCUTAneous route.  (Patient not taking: Reported on 11/8/2021)       No Known Allergies  Social History     Socioeconomic History    Marital status: SINGLE     Spouse name: Not on file    Number of children: Not on file    Years of education: Not on file    Highest education level: Not on file   Occupational History    Not on file   Tobacco Use    Smoking status: Never Smoker    Smokeless tobacco: Never Used   Vaping Use    Vaping Use: Never used   Substance and Sexual Activity    Alcohol use: Yes     Comment: \"every once in a while\"    Drug use: No    Sexual activity: Never     Birth control/protection: Injection   Other Topics Concern    Not on file   Social History Narrative    Not on file     Social Determinants of Health     Financial Resource Strain:     Difficulty of Paying Living Expenses: Not on file   Food Insecurity:     Worried About Running Out of Food in the Last Year: Not on file    Kim of Food in the Last Year: Not on file   Transportation Needs:     Lack of Transportation (Medical): Not on file    Lack of Transportation (Non-Medical):  Not on file   Physical Activity:     Days of Exercise per Week: Not on file    Minutes of Exercise per Session: Not on file   Stress:     Feeling of Stress : Not on file   Social Connections:     Frequency of Communication with Friends and Family: Not on file    Frequency of Social Gatherings with Friends and Family: Not on file    Attends [employer] Services: Not on file    Active Member of [address] [place] [address] [employer] or Organizations: Not on file    Attends Club or Organization Meetings: Not on file    Marital Status: Not on file   Intimate Partner Violence:     Fear of Current or Ex-Partner: Not on file    Emotionally Abused: Not on file    Physically Abused: Not on file    Sexually Abused: Not on file   Housing Stability:     Unable to Pay for Housing in the Last Year: Not on file    Number of [place] in the Last Year: Not on file    Unstable Housing in the Last Year: Not on file      Family History   Problem Relation Age of Onset    Hypertension Mother     Diabetes Father     Hypertension Brother        Review of Systems    General: negative for fever   Eyes: negative for vision loss   HENT: negative for cold symptoms   Respiratory negative for shortness of breath   Cardiac: negative for chest pain   Vascular negative for foot pain at night    Gastrointestinal: negative for abdominal pain   Genitourinary: negative for dysuria    Endocrine: negative for excessive thirst   Skin: negative for rash   Neurological: negative for paralysis   Psychiatric: negative for depression          Physical Exam:    Visit Vitals  /72 (BP 1 Location: Right upper arm, BP Patient Position: Sitting)   Pulse 86   Ht 5' 3\" (1.6 m)   Wt 340 lb (154.2 kg)   SpO2 99%   BMI 60.23 kg/m²        Constitutional:  Patient is well developed, well nourished, and not distressed. HEENT: atraumatic, normocephalic, wearing a mask. Eyes:   Cunjunctivae clear, no scleral icterus  Neck:   No JVD present. There is no Carotid bruit. Cardiovascular:  Normal rate, regular rhythm, normal heart sounds. No murmur heard. Pulses:       Right:      Radial         2+   Left:        Radial         2+         Pulmonary/Chest: Effort normal and breath sounds normal.  she has no wheezes or no rales. Abdominal:  Bowel sounds are present. Soft. No tenderness to palpation. Extremities: Normal range of motion. No edema. Digits no cyanosis or clubbing  Neurological:  she  is alert and oriented x3 . Gait normal. Motor & sensory grossly intact in all 4 limbs. Psych: Appropriate mood and affect. Skin:  Skin is warm and dry. No rash noted. No erythema. No ulcers. The treatment plan was reviewed with the patient in detail. The patient voiced understanding of this plan and all questions and concerns were addressed. The patient agrees with this plan. We discussed the signs and symptoms that would require earlier attention or intervention.      I appreciate the opportunity to participate in the care of your patient. I will be sure to keep you informed of any subsequent changes in the treatment plan. If you have any questions or concerns, please feel free to contact me.       Miguel A Sy MD

## 2021-11-08 NOTE — PROGRESS NOTES
1. Have you been to an emergency room or urgent care clinic since your last visit? No    Hospitalized since your last visit? If yes, where, when, and reason for visit? No  2. Have you seen or consulted any other health care providers outside of the [facility] since your last visit including any procedures, health maintenance items. If yes, where, when and reason for visit?  No

## 2022-01-19 PROBLEM — E66.01 MORBID OBESITY (HCC): Status: ACTIVE | Noted: 2022-01-19
